# Patient Record
Sex: FEMALE | Race: WHITE | NOT HISPANIC OR LATINO | Employment: FULL TIME | URBAN - METROPOLITAN AREA
[De-identification: names, ages, dates, MRNs, and addresses within clinical notes are randomized per-mention and may not be internally consistent; named-entity substitution may affect disease eponyms.]

---

## 2020-11-02 PROCEDURE — U0003 INFECTIOUS AGENT DETECTION BY NUCLEIC ACID (DNA OR RNA); SEVERE ACUTE RESPIRATORY SYNDROME CORONAVIRUS 2 (SARS-COV-2) (CORONAVIRUS DISEASE [COVID-19]), AMPLIFIED PROBE TECHNIQUE, MAKING USE OF HIGH THROUGHPUT TECHNOLOGIES AS DESCRIBED BY CMS-2020-01-R: HCPCS | Performed by: FAMILY MEDICINE

## 2020-11-04 ENCOUNTER — TELEPHONE (OUTPATIENT)
Dept: URGENT CARE | Facility: CLINIC | Age: 45
End: 2020-11-04

## 2021-07-02 ENCOUNTER — OFFICE (AMBULATORY)
Dept: URBAN - METROPOLITAN AREA CLINIC 64 | Facility: CLINIC | Age: 46
End: 2021-07-02

## 2021-07-02 VITALS
HEART RATE: 66 BPM | HEIGHT: 64 IN | DIASTOLIC BLOOD PRESSURE: 67 MMHG | WEIGHT: 143 LBS | SYSTOLIC BLOOD PRESSURE: 107 MMHG

## 2021-07-02 DIAGNOSIS — K59.00 CONSTIPATION, UNSPECIFIED: ICD-10-CM

## 2021-07-02 DIAGNOSIS — Z12.11 ENCOUNTER FOR SCREENING FOR MALIGNANT NEOPLASM OF COLON: ICD-10-CM

## 2021-07-02 DIAGNOSIS — K30 FUNCTIONAL DYSPEPSIA: ICD-10-CM

## 2021-07-02 DIAGNOSIS — R13.10 DYSPHAGIA, UNSPECIFIED: ICD-10-CM

## 2021-07-02 PROCEDURE — 99204 OFFICE O/P NEW MOD 45 MIN: CPT | Performed by: NURSE PRACTITIONER

## 2021-07-02 RX ORDER — OMEPRAZOLE 40 MG/1
40 CAPSULE, DELAYED RELEASE ORAL
Qty: 90 | Refills: 2 | Status: COMPLETED
Start: 2021-07-02 | End: 2021-12-06

## 2021-09-10 ENCOUNTER — OFFICE (AMBULATORY)
Dept: URBAN - METROPOLITAN AREA PATHOLOGY 4 | Facility: PATHOLOGY | Age: 46
End: 2021-09-10

## 2021-09-10 ENCOUNTER — ON CAMPUS - OUTPATIENT (AMBULATORY)
Dept: URBAN - METROPOLITAN AREA HOSPITAL 2 | Facility: HOSPITAL | Age: 46
End: 2021-09-10
Payer: COMMERCIAL

## 2021-09-10 VITALS
DIASTOLIC BLOOD PRESSURE: 58 MMHG | HEART RATE: 62 BPM | SYSTOLIC BLOOD PRESSURE: 147 MMHG | HEART RATE: 79 BPM | SYSTOLIC BLOOD PRESSURE: 148 MMHG | RESPIRATION RATE: 126 BRPM | DIASTOLIC BLOOD PRESSURE: 79 MMHG | DIASTOLIC BLOOD PRESSURE: 76 MMHG | HEART RATE: 63 BPM | SYSTOLIC BLOOD PRESSURE: 125 MMHG | HEIGHT: 64 IN | SYSTOLIC BLOOD PRESSURE: 116 MMHG | DIASTOLIC BLOOD PRESSURE: 86 MMHG | SYSTOLIC BLOOD PRESSURE: 120 MMHG | SYSTOLIC BLOOD PRESSURE: 134 MMHG | SYSTOLIC BLOOD PRESSURE: 105 MMHG | OXYGEN SATURATION: 100 % | DIASTOLIC BLOOD PRESSURE: 74 MMHG | HEART RATE: 52 BPM | DIASTOLIC BLOOD PRESSURE: 73 MMHG | OXYGEN SATURATION: 99 % | DIASTOLIC BLOOD PRESSURE: 70 MMHG | RESPIRATION RATE: 18 BRPM | SYSTOLIC BLOOD PRESSURE: 112 MMHG | HEART RATE: 74 BPM | HEART RATE: 76 BPM | RESPIRATION RATE: 16 BRPM | HEART RATE: 67 BPM | HEART RATE: 57 BPM | SYSTOLIC BLOOD PRESSURE: 117 MMHG | HEART RATE: 82 BPM | DIASTOLIC BLOOD PRESSURE: 81 MMHG | TEMPERATURE: 98.4 F | WEIGHT: 145 LBS

## 2021-09-10 DIAGNOSIS — K64.8 OTHER HEMORRHOIDS: ICD-10-CM

## 2021-09-10 DIAGNOSIS — R13.10 DYSPHAGIA, UNSPECIFIED: ICD-10-CM

## 2021-09-10 DIAGNOSIS — D13.1 BENIGN NEOPLASM OF STOMACH: ICD-10-CM

## 2021-09-10 DIAGNOSIS — Z12.11 ENCOUNTER FOR SCREENING FOR MALIGNANT NEOPLASM OF COLON: ICD-10-CM

## 2021-09-10 DIAGNOSIS — K63.5 POLYP OF COLON: ICD-10-CM

## 2021-09-10 DIAGNOSIS — K31.9 DISEASE OF STOMACH AND DUODENUM, UNSPECIFIED: ICD-10-CM

## 2021-09-10 DIAGNOSIS — D12.2 BENIGN NEOPLASM OF ASCENDING COLON: ICD-10-CM

## 2021-09-10 LAB
GI HISTOLOGY: A. SELECT: (no result)
GI HISTOLOGY: B. SELECT: (no result)
GI HISTOLOGY: C. UNSPECIFIED: (no result)
GI HISTOLOGY: PDF REPORT: (no result)

## 2021-09-10 PROCEDURE — 43450 DILATE ESOPHAGUS 1/MULT PASS: CPT | Performed by: INTERNAL MEDICINE

## 2021-09-10 PROCEDURE — 43239 EGD BIOPSY SINGLE/MULTIPLE: CPT | Performed by: INTERNAL MEDICINE

## 2021-09-10 PROCEDURE — 88305 TISSUE EXAM BY PATHOLOGIST: CPT | Performed by: INTERNAL MEDICINE

## 2021-09-10 PROCEDURE — 45385 COLONOSCOPY W/LESION REMOVAL: CPT | Mod: 33 | Performed by: INTERNAL MEDICINE

## 2021-09-10 PROCEDURE — 45380 COLONOSCOPY AND BIOPSY: CPT | Mod: 33,59 | Performed by: INTERNAL MEDICINE

## 2021-09-10 PROCEDURE — 45380 COLONOSCOPY AND BIOPSY: CPT | Mod: 59,33 | Performed by: INTERNAL MEDICINE

## 2021-09-20 ENCOUNTER — OFFICE (AMBULATORY)
Dept: URBAN - METROPOLITAN AREA CLINIC 64 | Facility: CLINIC | Age: 46
End: 2021-09-20

## 2021-09-20 VITALS
WEIGHT: 148 LBS | HEIGHT: 64 IN | SYSTOLIC BLOOD PRESSURE: 93 MMHG | HEART RATE: 65 BPM | DIASTOLIC BLOOD PRESSURE: 58 MMHG

## 2021-09-20 DIAGNOSIS — K59.00 CONSTIPATION, UNSPECIFIED: ICD-10-CM

## 2021-09-20 DIAGNOSIS — R13.10 DYSPHAGIA, UNSPECIFIED: ICD-10-CM

## 2021-09-20 PROBLEM — D37.1 NEOPLASM OF UNCERTAIN BEHAVIOR OF STOMACH: Status: ACTIVE | Noted: 2021-09-10

## 2021-09-20 PROCEDURE — 99214 OFFICE O/P EST MOD 30 MIN: CPT | Performed by: NURSE PRACTITIONER

## 2021-09-20 RX ORDER — OMEPRAZOLE 40 MG/1
CAPSULE, DELAYED RELEASE ORAL
Qty: 90 | Refills: 1 | Status: ACTIVE

## 2022-03-28 RX ORDER — BUPROPION HYDROCHLORIDE 100 MG/1
100 TABLET ORAL DAILY
COMMUNITY

## 2022-04-02 ENCOUNTER — LAB (OUTPATIENT)
Dept: LAB | Facility: HOSPITAL | Age: 47
End: 2022-04-02

## 2022-04-02 PROCEDURE — C9803 HOPD COVID-19 SPEC COLLECT: HCPCS

## 2022-04-02 PROCEDURE — U0004 COV-19 TEST NON-CDC HGH THRU: HCPCS

## 2022-04-03 LAB — SARS-COV-2 ORF1AB RESP QL NAA+PROBE: NOT DETECTED

## 2022-04-04 ENCOUNTER — ANESTHESIA EVENT (OUTPATIENT)
Dept: GASTROENTEROLOGY | Facility: HOSPITAL | Age: 47
End: 2022-04-04

## 2022-04-04 NOTE — ANESTHESIA PREPROCEDURE EVALUATION
Anesthesia Evaluation     Patient summary reviewed and Nursing notes reviewed   NPO Solid Status: > 6 hours  NPO Liquid Status: > 6 hours           Airway   Mallampati: II  TM distance: >3 FB  Neck ROM: full  No difficulty expected  Dental      Pulmonary - negative pulmonary ROS and normal exam    breath sounds clear to auscultation  Cardiovascular - negative cardio ROS and normal exam    Rhythm: regular  Rate: normal        Neuro/Psych- negative ROS  GI/Hepatic/Renal/Endo    (+)  GERD,      Musculoskeletal (-) negative ROS    Abdominal  - normal exam   Substance History - negative use     OB/GYN          Other                        Anesthesia Plan    ASA 2     general   total IV anesthesia(Patient identified; pre-operative vital signs, all relevant labs/studies, complete medical/surgical/anesthetic history, full medication list, full allergy list, and NPO status obtained/reviewed; physical assessment performed; anesthetic options, side effects, potential complications, risks, and benefits discussed; questions answered; written anesthesia consent obtained; patient cleared for procedure; anesthesia machine and equipment checked and functioning)  intravenous induction     Anesthetic plan, all risks, benefits, and alternatives have been provided, discussed and informed consent has been obtained with: patient.        CODE STATUS:

## 2022-04-05 ENCOUNTER — HOSPITAL ENCOUNTER (OUTPATIENT)
Facility: HOSPITAL | Age: 47
Setting detail: HOSPITAL OUTPATIENT SURGERY
Discharge: HOME | End: 2022-04-05
Attending: INTERNAL MEDICINE | Admitting: INTERNAL MEDICINE

## 2022-04-05 ENCOUNTER — ANESTHESIA (OUTPATIENT)
Dept: GASTROENTEROLOGY | Facility: HOSPITAL | Age: 47
End: 2022-04-05

## 2022-04-05 ENCOUNTER — ON CAMPUS - OUTPATIENT (AMBULATORY)
Dept: URBAN - METROPOLITAN AREA HOSPITAL 85 | Facility: HOSPITAL | Age: 47
End: 2022-04-05

## 2022-04-05 VITALS
HEIGHT: 65 IN | WEIGHT: 146.83 LBS | HEART RATE: 77 BPM | SYSTOLIC BLOOD PRESSURE: 107 MMHG | BODY MASS INDEX: 24.46 KG/M2 | RESPIRATION RATE: 17 BRPM | TEMPERATURE: 98.5 F | DIASTOLIC BLOOD PRESSURE: 68 MMHG | OXYGEN SATURATION: 96 %

## 2022-04-05 DIAGNOSIS — K31.7 POLYP OF STOMACH AND DUODENUM: ICD-10-CM

## 2022-04-05 DIAGNOSIS — R10.13 DYSPEPSIA: ICD-10-CM

## 2022-04-05 DIAGNOSIS — R13.10 DYSPHAGIA, UNSPECIFIED: ICD-10-CM

## 2022-04-05 DIAGNOSIS — K21.9 GERD (GASTROESOPHAGEAL REFLUX DISEASE): ICD-10-CM

## 2022-04-05 PROCEDURE — 25010000002 PROPOFOL 200 MG/20ML EMULSION: Performed by: ANESTHESIOLOGY

## 2022-04-05 PROCEDURE — 43450 DILATE ESOPHAGUS 1/MULT PASS: CPT | Mod: 59 | Performed by: INTERNAL MEDICINE

## 2022-04-05 PROCEDURE — 88305 TISSUE EXAM BY PATHOLOGIST: CPT | Performed by: INTERNAL MEDICINE

## 2022-04-05 PROCEDURE — 43251 EGD REMOVE LESION SNARE: CPT | Performed by: INTERNAL MEDICINE

## 2022-04-05 RX ORDER — MIDAZOLAM HYDROCHLORIDE 1 MG/ML
1 INJECTION INTRAMUSCULAR; INTRAVENOUS
Status: DISCONTINUED | OUTPATIENT
Start: 2022-04-05 | End: 2022-04-05 | Stop reason: HOSPADM

## 2022-04-05 RX ORDER — SODIUM CHLORIDE 9 MG/ML
50 INJECTION, SOLUTION INTRAVENOUS CONTINUOUS
Status: DISCONTINUED | OUTPATIENT
Start: 2022-04-05 | End: 2022-04-05 | Stop reason: HOSPADM

## 2022-04-05 RX ORDER — SODIUM CHLORIDE 0.9 % (FLUSH) 0.9 %
3 SYRINGE (ML) INJECTION EVERY 12 HOURS SCHEDULED
Status: DISCONTINUED | OUTPATIENT
Start: 2022-04-05 | End: 2022-04-05 | Stop reason: HOSPADM

## 2022-04-05 RX ORDER — SODIUM CHLORIDE 0.9 % (FLUSH) 0.9 %
3-10 SYRINGE (ML) INJECTION AS NEEDED
Status: DISCONTINUED | OUTPATIENT
Start: 2022-04-05 | End: 2022-04-05 | Stop reason: HOSPADM

## 2022-04-05 RX ORDER — SODIUM CHLORIDE 0.9 % (FLUSH) 0.9 %
10 SYRINGE (ML) INJECTION EVERY 12 HOURS SCHEDULED
Status: DISCONTINUED | OUTPATIENT
Start: 2022-04-05 | End: 2022-04-05 | Stop reason: HOSPADM

## 2022-04-05 RX ORDER — ONDANSETRON 2 MG/ML
4 INJECTION INTRAMUSCULAR; INTRAVENOUS ONCE AS NEEDED
Status: DISCONTINUED | OUTPATIENT
Start: 2022-04-05 | End: 2022-04-05 | Stop reason: HOSPADM

## 2022-04-05 RX ORDER — SODIUM CHLORIDE 0.9 % (FLUSH) 0.9 %
10 SYRINGE (ML) INJECTION AS NEEDED
Status: DISCONTINUED | OUTPATIENT
Start: 2022-04-05 | End: 2022-04-05 | Stop reason: HOSPADM

## 2022-04-05 RX ORDER — PROPOFOL 10 MG/ML
INJECTION, EMULSION INTRAVENOUS AS NEEDED
Status: DISCONTINUED | OUTPATIENT
Start: 2022-04-05 | End: 2022-04-05 | Stop reason: SURG

## 2022-04-05 RX ORDER — LIDOCAINE HYDROCHLORIDE 20 MG/ML
INJECTION, SOLUTION EPIDURAL; INFILTRATION; INTRACAUDAL; PERINEURAL AS NEEDED
Status: DISCONTINUED | OUTPATIENT
Start: 2022-04-05 | End: 2022-04-05 | Stop reason: SURG

## 2022-04-05 RX ORDER — SODIUM CHLORIDE 9 MG/ML
30 INJECTION, SOLUTION INTRAVENOUS CONTINUOUS PRN
Status: DISCONTINUED | OUTPATIENT
Start: 2022-04-05 | End: 2022-04-05 | Stop reason: HOSPADM

## 2022-04-05 RX ADMIN — SODIUM CHLORIDE 50 ML/HR: 9 INJECTION, SOLUTION INTRAVENOUS at 07:23

## 2022-04-05 RX ADMIN — PROPOFOL 25 MG: 10 INJECTION, EMULSION INTRAVENOUS at 08:09

## 2022-04-05 RX ADMIN — PROPOFOL 50 MG: 10 INJECTION, EMULSION INTRAVENOUS at 08:15

## 2022-04-05 RX ADMIN — PROPOFOL 25 MG: 10 INJECTION, EMULSION INTRAVENOUS at 08:11

## 2022-04-05 RX ADMIN — LIDOCAINE HYDROCHLORIDE 75 MG: 20 INJECTION, SOLUTION EPIDURAL; INFILTRATION; INTRACAUDAL; PERINEURAL at 08:08

## 2022-04-05 RX ADMIN — PROPOFOL 25 MG: 10 INJECTION, EMULSION INTRAVENOUS at 08:19

## 2022-04-05 RX ADMIN — PROPOFOL 25 MG: 10 INJECTION, EMULSION INTRAVENOUS at 08:13

## 2022-04-05 RX ADMIN — PROPOFOL 100 MG: 10 INJECTION, EMULSION INTRAVENOUS at 08:08

## 2022-04-05 RX ADMIN — PROPOFOL 50 MG: 10 INJECTION, EMULSION INTRAVENOUS at 08:17

## 2022-04-05 NOTE — ANESTHESIA POSTPROCEDURE EVALUATION
Patient: Betty Mcmullen    Procedure Summary     Date: 04/05/22 Room / Location: [facility] ENDOSCOPY 4 / [facility] ENDOSCOPY    Anesthesia Start: 0804 Anesthesia Stop: 0824    Procedure: ESOPHAGOGASTRODUODENOSCOPY with polypectomy x13 and dilatation (52 bougie) (N/A ) Diagnosis:       GERD (gastroesophageal reflux disease)      Dyspepsia      (GERD (gastroesophageal reflux disease) [K21.9])      (Dyspepsia [R10.13])    Surgeons: Radha Elkins MD Provider: Brian Bojra MD    Anesthesia Type: general ASA Status: 2          Anesthesia Type: general    Vitals  No vitals data found for the desired time range.          Post Anesthesia Care and Evaluation    Patient location during evaluation: PACU  Patient participation: complete - patient cannot participate  Level of consciousness: responsive to physical stimuli and responsive to light touch  Pain scale: See nurse's notes for pain score.  Pain management: adequate  Airway patency: patent  Anesthetic complications: No anesthetic complications  PONV Status: none  Cardiovascular status: acceptable  Respiratory status: acceptable, face mask and oral airway  Hydration status: acceptable    Comments: Patient seen and examined postoperatively; vital signs stable; SpO2 greater than or equal to 90%; cardiopulmonary status stable; nausea/vomiting adequately controlled; pain adequately controlled; no apparent anesthesia complications; patient discharged from anesthesia care when discharge criteria were met

## 2022-04-05 NOTE — H&P
" LOS: 0 days   Patient Care Team:  Tracie Brice MD as PCP - General (Family Medicine)      Subjective     Interval History:     Subjective: Gastric polyp and Dysphagia      ROS:   No chest pain, shortness of breath, or cough.        Medication Review:     Current Facility-Administered Medications:   •  midazolam (VERSED) injection 1 mg, 1 mg, Intravenous, Q10 Min PRN, Brian Borja MD  •  sodium chloride 0.9 % flush 10 mL, 10 mL, Intravenous, Q12H, Brian Borja MD  •  sodium chloride 0.9 % flush 10 mL, 10 mL, Intravenous, PRN, Brian Borja MD  •  sodium chloride 0.9 % infusion, 50 mL/hr, Intravenous, Continuous, Radha Elkins MD, Last Rate: 50 mL/hr at 04/05/22 0723, 50 mL/hr at 04/05/22 0723      Objective     Vital Signs  Vitals:    03/28/22 1428 04/05/22 0714   BP:  109/50   BP Location:  Left arm   Patient Position:  Sitting   Pulse:  69   Resp:  18   Temp:  98.5 °F (36.9 °C)   TempSrc:  Oral   SpO2:  98%   Weight: 62.6 kg (138 lb) 66.6 kg (146 lb 13.2 oz)   Height: 165.1 cm (65\") 165.1 cm (65\")       Physical Exam:    General Appearance:    Awake and alert, in no acute distress   Head:    Normocephalic, without obvious abnormality   Eyes:          Conjunctivae normal, anicteric sclera   Throat:   No oral lesions, no thrush, oral mucosa moist   Neck:   No adenopathy, supple, no JVD   Lungs:     respirations regular, even and unlabored   Abdomen:     Soft, non-tender, no rebound or guarding, non-distended, no hepatosplenomegaly   Rectal:     Deferred   Extremities:   No edema, no cyanosis   Skin:   No bruising or rash, no jaundice        Results Review:    Lab Results (last 24 hours)     ** No results found for the last 24 hours. **          Imaging Results (Last 24 Hours)     ** No results found for the last 24 hours. **            Assessment/Plan   Proceed with scope and MAC anesthesia        Radha Elkins MD  04/05/22  08:05 EDT  "

## 2022-04-05 NOTE — OP NOTE
ESOPHAGOGASTRODUODENOSCOPY Procedure Report    Patient Name:  Betty Mcmullen  YOB: 1975    Date of Surgery:  4/5/2022     Pre-Op Diagnosis:  Personal history of gastric polyp adenoma  Dysphagia       Post Op Diagnosis:  Gastric polyps      Procedure/CPT® Codes:      Procedure(s):  ESOPHAGOGASTRODUODENOSCOPY with polypectomy x13 and dilatation (52 bougie)    Staff:  Surgeon(s):  Radha Elkins MD         Anesthesia: Monitored Anesthesia Care    Implants:    Nothing was implanted during the procedure    Specimen:        See Below    No blood loss    Complications:  None     Description of Procedure:  Informed consent was obtained for the procedure, including sedation.  Risks of perforation, hemorrhage, adverse drug reaction and aspiration were discussed.  The patient was brought into the endoscopy suite. Continuous cardiopulmonary monitoring was performed. The patient was placed in the left lateral decubitus position.  The bite block was inserted into the patient's mouth. After adequate sedation was attained, the Olympus gastroscope was inserted into the patient's mouth and advanced to the second portion of the duodenum without difficulty.  Circumferential examination was performed. A retroflex exam was performed in the patient's stomach.  On completion of the exam, the bowel was decompressed, the scope was removed from the patient, the patient tolerated the procedure well, there were no immediate post-operative complications.     Examination of the esophagus showed normal mucosa.  Esophageal dilation was performed with a 52 Latvian Mccray.  Repeat esophagoscopy showed no mucosal tearing  Examination of the stomach showed multiple gastric polyps involving the gastric body.  Most of them appeared to be fundic gland polyps, but because one was an adenoma on EGD last year, I performed a cold snare polypectomy to all of the visualized polyps of the gastric body.  The polyps were completely  removed.  Retroflex examination of the stomach was normal   Examination of the duodenum showed normal mucosa      Impression:  Gastric polyps x13, 5 to 10 mm in size, removed in single piece fashion with cold snare polypectomy  Normal-appearing esophagus status post 52 French Mccray dilation    Recommendations:  Follow-up histopathology  Repeat EGD in 1 year to follow-up gastric tubular adenoma  Repeat EGD as needed for dysphagia  Follow-up in the office within 3 to 6 months      Radha Elkins MD     Date: 4/5/2022  Time: 08:22 EDT

## 2022-04-05 NOTE — ADDENDUM NOTE
Addendum  created 04/05/22 0826 by Brian Borja MD    Intraprocedure Event edited, Review and Sign - Ready for Procedure

## 2022-04-05 NOTE — DISCHARGE INSTRUCTIONS
A responsible adult should stay with you and you should rest quietly for the rest of the day.    Do not drink alcohol, drive, operate any heavy machinery or power tools or make any legal/important decisions for the next 24 hours.     Progress your diet as tolerated.  If you begin to experience severe pain, increased shortness of breath, racing heartbeat or a fever above 101 F, seek immediate medical attention.     Follow up with MD as instructed. Call office for results in 3 to 5 days if needed.[phone]  :  Gastric polyps x13, 5 to 10 mm in size, removed in single piece fashion with cold snare polypectomy  Normal-appearing esophagus status post 52 French Mccray dilation     Recommendations:  Follow-up histopathology  Repeat EGD in 1 year to follow-up gastric tubular adenoma  Repeat EGD as needed for dysphagia  Follow-up in the office within 3 to 6 months

## 2022-04-06 LAB
LAB AP CASE REPORT: NORMAL
PATH REPORT.FINAL DX SPEC: NORMAL
PATH REPORT.GROSS SPEC: NORMAL

## 2022-06-03 ENCOUNTER — TREATMENT (OUTPATIENT)
Dept: PHYSICAL THERAPY | Facility: CLINIC | Age: 47
End: 2022-06-03

## 2022-06-03 DIAGNOSIS — G89.29 CHRONIC LEFT-SIDED LOW BACK PAIN WITH LEFT-SIDED SCIATICA: Primary | ICD-10-CM

## 2022-06-03 DIAGNOSIS — M54.42 CHRONIC LEFT-SIDED LOW BACK PAIN WITH LEFT-SIDED SCIATICA: Primary | ICD-10-CM

## 2022-06-03 DIAGNOSIS — M54.2 PAIN, NECK: ICD-10-CM

## 2022-06-03 PROCEDURE — 97162 PT EVAL MOD COMPLEX 30 MIN: CPT | Performed by: PHYSICAL THERAPIST

## 2022-06-03 PROCEDURE — 97110 THERAPEUTIC EXERCISES: CPT | Performed by: PHYSICAL THERAPIST

## 2022-06-03 NOTE — PROGRESS NOTES
Physical Therapy Initial Evaluation and Plan of Care    Patient: Betty Mcmullen   : 1975  Diagnosis/ICD-10 Code:  Chronic left-sided low back pain with left-sided sciatica [M54.42, G89.29]  Referring practitioner: Self Referring  Date of initial visit : 6/3/2022  Today's Date: 6/3/2022  Patient seen for 1  session    Visit Diagnoses:    ICD-10-CM ICD-9-CM   1. Chronic left-sided low back pain with left-sided sciatica  M54.42 724.2    G89.29 724.3     338.29   2. Pain, neck  M54.2 723.1        Subjective Evaluation    History of Present Illness  Mechanism of injury: Patient is a 46 year old female who presents self referral with lower back and neck pain.   She reports multi-year history of lower back pain provoked with exercising and with referral intermittently into her left leg and hip.  Reports recent right sided neck pain and tingling in her right hand as well with no specific change in activity.  Pain provoked by lifting weights, sleeping, prolonged positioning  Prior physical therapy for her lower back pain has been helpful including dry needling but her PT moved recently.      Subjective comment: Revised Oswestry - 20%  Patient Occupation:   Quality of life: good    Pain  Current pain ratin  At best pain ratin  At worst pain ratin (Lower back 7-8/10, 6-7/10 in R UE)  Pain location: Lower back, neck, R UE.  Quality: burning and dull ache  Relieving factors: change in position  Exacerbated by: Lifting weight, sleeping.  Progression: no change    Diagnostic Tests  Abnormal x-ray: No recent imaging.    Treatments  Previous treatment: physical therapy  Patient Goals  Patient goals for therapy: decreased pain  Patient's goals regarding treatment: Decreased pain and ability for full prior activity            Objective          Static Posture     Comments  Flat upper thoracic spine, slight increase lumbar lordosis, moderate paraspinal guarding    Neurological Testing      Sensation   Cervical/Thoracic   Left   Intact: light touch    Right   Intact: light touch    Lumbar   Left   Intact: light touch    Right   Intact: light touch    Reflexes   Left   Biceps (C5/C6): normal (2+)  Brachioradialis (C6): normal (2+)  Triceps (C7): normal (2+)  Patellar (L4): normal (2+)  Achilles (S1): trace (1+)    Right   Biceps (C5/C6): normal (2+)  Brachioradialis (C6): normal (2+)  Triceps (C7): normal (2+)  Patellar (L4): normal (2+)  Achilles (S1): trace (1+)    Active Range of Motion   Cervical/Thoracic Spine   Cervical    Left lateral flexion: 18 degrees   Right lateral flexion: 22 degrees   Left rotation: 50 degrees   Right rotation: 65 degrees     Additional Active Range of Motion Details  Slight decrease upper cervical rotation to L   FB 50% decrease  BB - 40% decrease  RR - 25% decrease  RSB/LSB 10% decrease    Strength/Myotome Testing     Lumbar   Left   Normal strength    Right   Normal strength    Tests   Cervical     Left   Positive active compression ([place]) and cervical distraction.   Negative Spurling's sign.     Right   Positive active compression ([place]) and cervical distraction.   Negative Spurling's sign.     Thoracic   Negative slump.     Additional Tests Details  Spurling's negative for UE symptoms - painful bilaterally near CT junction    Slump + L negative R          Assessment & Plan     Assessment  Impairments: abnormal or restricted ROM, activity intolerance, lacks appropriate home exercise program and pain with function  Functional Limitations: carrying objects, lifting, sleeping, pulling, pushing, uncomfortable because of pain and reaching overhead  Assessment details: Presents with limits and pain with cervical spine and lumbar spine ROM, positive neural tension testing greater in R UE and L UE, positive cervical distraction and positive cervical downglide R lower cervical spine, paraspinal guarding in lumbar spine in standing and activity limits including  sleeping, overhead lifting, squatting, deadlifting per pain.  Positive prior response to physical therapy and to UE symptoms with treatment this visit.  She would benefit from skilled PT to address the above and restore to highest level of prior function.   Prognosis: good    Goals  Plan Goals: ST. Neck and lumbar spine pain decreased to 5/10 at worst over 2 weeks.   2. Independent and compliant with HEP and activity modification instructions over 2 weeks.   3. R UE symptoms to begin to centralize over 2 weeks.     LT. Neck and lumbar spine pain decreased to 3/10 at worst with prior level of activities over 8 weeks.   2. Cervical and lumbar AROM all planes WFL w/o pain overe 8 weeks.   3. Negative neural tension testing in R UE and L LE over 8 weeks.   4. To verbalize readiness for discharge over 10 weeks per improved symptoms and HEP independence.       Plan  Therapy options: will be seen for skilled therapy services  Planned modality interventions: cryotherapy, dry needling, electrical stimulation/Russian stimulation, TENS and traction  Planned therapy interventions: manual therapy, neuromuscular re-education, balance/weight-bearing training, flexibility, soft tissue mobilization, spinal/joint mobilization, functional ROM exercises, strengthening, stretching, therapeutic activities and joint mobilization  Frequency: 2x week  Duration in weeks: 8  Treatment plan discussed with: patient        History # of Personal Factors and/or Comorbidities: MODERATE (1-2)  Examination of Body System(s): # of elements: MODERATE (3)  Clinical Presentation: EVOLVING  Clinical Decision Making: MODERATE       Timed:         Manual Therapy:    5     mins  30350;     Therapeutic Exercise:    10     mins  20994;       Un-Timed:  Mod Eval     30     Mins  44457      Timed Treatment:   30   mins   Total Treatment:     45   mins          PT SIGNATURE: Iban Rowell PT, DPT   [place] Lic #[id number]    DATE TREATMENT INITIATED:  6/3/2022    Initial Certification  Certification Period: 9/1/2022  I certify that the therapy services are furnished while this patient is under my care.  The services outlined above are required by this patient, and will be reviewed every 90 days.     PHYSICIAN: Referring, Self      DATE:     Please sign and return via fax to [phone].. Thank you, [facility].

## 2022-06-08 ENCOUNTER — TREATMENT (OUTPATIENT)
Dept: PHYSICAL THERAPY | Facility: CLINIC | Age: 47
End: 2022-06-08

## 2022-06-08 DIAGNOSIS — M54.2 PAIN, NECK: Primary | ICD-10-CM

## 2022-06-08 PROCEDURE — 97012 MECHANICAL TRACTION THERAPY: CPT | Performed by: PHYSICAL THERAPIST

## 2022-06-08 NOTE — PROGRESS NOTES
Physical Therapy Daily Progress Note  Visit: 2    Betty Mcmullen reports: her neck felt better with initial visit.  Lower back is sore but typical for her.   YOB: 1975  Referring practitioner : Self Referring  Date of Initial: Type: THERAPY  Noted: 6/3/2022  Today's date: 6/8/2022  Patient seen for 2 sessions    Visit Diagnoses:    ICD-10-CM ICD-9-CM   1. Pain, neck  M54.2 723.1       Subjective       Objective   See Exercise, Manual, and Modality Logs for complete treatment.       Assessment/Plan    Requested traction only per good tolerance with manual traction at initial evaluation.  Symptoms centralizing in R UE.            Un-timed:         Traction     15     mins 34675      Timed Treatment:   0   mins   Total Treatment:     15   mins         Iban Rowell PT, DPT  Physical Therapist  [place] Lic #[id number]   Danger to self; mental illness expected to respond to inpatient care Danger to self; mental illness expected to respond to inpatient care Danger to self; mental illness expected to respond to inpatient care Danger to self; mental illness expected to respond to inpatient care

## 2022-06-13 ENCOUNTER — TREATMENT (OUTPATIENT)
Dept: PHYSICAL THERAPY | Facility: CLINIC | Age: 47
End: 2022-06-13

## 2022-06-13 DIAGNOSIS — M54.2 PAIN, NECK: Primary | ICD-10-CM

## 2022-06-13 DIAGNOSIS — G89.29 CHRONIC LEFT-SIDED LOW BACK PAIN WITH LEFT-SIDED SCIATICA: ICD-10-CM

## 2022-06-13 DIAGNOSIS — M54.42 CHRONIC LEFT-SIDED LOW BACK PAIN WITH LEFT-SIDED SCIATICA: ICD-10-CM

## 2022-06-13 PROCEDURE — 20560 NDL INSJ W/O NJX 1 OR 2 MUSC: CPT | Performed by: PHYSICAL THERAPIST

## 2022-06-13 PROCEDURE — 97012 MECHANICAL TRACTION THERAPY: CPT | Performed by: PHYSICAL THERAPIST

## 2022-06-15 ENCOUNTER — TELEPHONE (OUTPATIENT)
Dept: PHYSICAL THERAPY | Facility: CLINIC | Age: 47
End: 2022-06-15

## 2022-06-15 NOTE — TELEPHONE ENCOUNTER
PATIENT LEFT AND DID NOT SCHEDULE ANY FOLLOW UP APPT. CAN YOU SEE IF YOU CAN SCHEDULE FOR THE NEXT THREE WEEKS. I STARTED FROM WHERE I COULD GET DATES BUT THEIR IS A WEEK IN July I DID NOT SEE ANYTHING. IF YOU COULD REACH OUT AND LET HER KNOW WHAT THE GAME PLAN IS THAT WOULD BE GREAT.

## 2022-06-20 ENCOUNTER — TREATMENT (OUTPATIENT)
Dept: PHYSICAL THERAPY | Facility: CLINIC | Age: 47
End: 2022-06-20

## 2022-06-20 DIAGNOSIS — M54.2 PAIN, NECK: Primary | ICD-10-CM

## 2022-06-20 DIAGNOSIS — M54.42 CHRONIC LEFT-SIDED LOW BACK PAIN WITH LEFT-SIDED SCIATICA: ICD-10-CM

## 2022-06-20 DIAGNOSIS — G89.29 CHRONIC LEFT-SIDED LOW BACK PAIN WITH LEFT-SIDED SCIATICA: ICD-10-CM

## 2022-06-20 PROCEDURE — 20561 NDL INSJ W/O NJX 3+ MUSC: CPT | Performed by: PHYSICAL THERAPIST

## 2022-06-20 PROCEDURE — 97012 MECHANICAL TRACTION THERAPY: CPT | Performed by: PHYSICAL THERAPIST

## 2022-06-20 NOTE — PROGRESS NOTES
Physical Therapy Daily Progress Note    VISIT#: 4    Subjective   Betty Mcmullen reports that the dry needling helped and she would like to continue today. Her low back has also been bothering her a lot and she would like to work on that as well.       Objective     See Exercise, Manual, and Modality Logs for complete treatment.     Patient Education: TDN    Assessment/Plan   Continued cervical traction and dry needling. Pt tolerated well. TDN completed by Iban Rowell PT. Plan to continue treatments next session according to symptoms/needs.     Progress per Plan of Care and Progress strengthening /stabilization /functional activity          Timed:         Manual Therapy:         mins  88448;     Therapeutic Exercise:         mins  10821;     Neuromuscular Harshad:        mins  51630;    Therapeutic Activity:          mins  88902;     Gait Training:           mins  66272;     Ultrasound:          mins  45571;    Ionto                                   mins   09475  Self Care                            mins   65900  Canalith Repos                   mins  36246    Un-Timed:  Electrical Stimulation:         mins  76139 ( );  Dry Needling     10     mins self-pay  Traction     15     mins 46021  Low Eval          Mins  55012  Mod Eval          Mins  00548  High Eval                            Mins  45757  Re-Eval                               mins  75665    Timed Treatment:   0   mins   Total Treatment:     25   mins          Ania Torres  Physical Therapist Assistant  [place] License # [id number]

## 2022-06-29 ENCOUNTER — TREATMENT (OUTPATIENT)
Dept: PHYSICAL THERAPY | Facility: CLINIC | Age: 47
End: 2022-06-29

## 2022-06-29 DIAGNOSIS — G89.29 CHRONIC LEFT-SIDED LOW BACK PAIN WITH LEFT-SIDED SCIATICA: ICD-10-CM

## 2022-06-29 DIAGNOSIS — M54.42 CHRONIC LEFT-SIDED LOW BACK PAIN WITH LEFT-SIDED SCIATICA: ICD-10-CM

## 2022-06-29 DIAGNOSIS — M54.2 PAIN, NECK: Primary | ICD-10-CM

## 2022-06-29 PROCEDURE — 97012 MECHANICAL TRACTION THERAPY: CPT | Performed by: PHYSICAL THERAPIST

## 2022-06-29 PROCEDURE — 20561 NDL INSJ W/O NJX 3+ MUSC: CPT | Performed by: PHYSICAL THERAPIST

## 2022-06-29 NOTE — PROGRESS NOTES
Physical Therapy Daily Progress Note      Patient: Betty Mcmullen   : 1975  Diagnosis/ICD-10 Code:  Pain, neck [M54.2]  Referring practitioner: Self Referring  Date of Initial Visit: Type: THERAPY  Noted: 6/3/2022  Today's Date: 2022  Patient seen for 5 sessions             Subjective Traction and dry needling continue to help with her cervical and right UE symptoms, stating that her right UE is not experiencing as much pain.  She also states that her low back has has been bothering her more lately, have some radicular symptoms down her left leg.    Objective   See Exercise, Manual, and Modality Logs for complete treatment.       Assessment/Plan  Pt needed reduction in traction pull today from 18 down to 16 lbs.  She responded well with 16 lbs well today.  Also, tolerated dry needling well today.  Possibly add lumbar traction to treatment due to back and leg symptoms.  Dry needling completed by Iban Rowell DPT.    Progress per Plan of Care             Un-Timed:  Dry Needling    15     mins self-pay  Traction    15    mins 23322      Timed Treatment:   0   mins   Total Treatment:     30   mins        Nathan Almonte PTA  Physical Therapist Assistant

## 2022-07-01 ENCOUNTER — OFFICE (AMBULATORY)
Dept: URBAN - METROPOLITAN AREA CLINIC 64 | Facility: CLINIC | Age: 47
End: 2022-07-01

## 2022-07-01 VITALS
HEIGHT: 64 IN | SYSTOLIC BLOOD PRESSURE: 122 MMHG | HEART RATE: 81 BPM | WEIGHT: 147 LBS | DIASTOLIC BLOOD PRESSURE: 77 MMHG

## 2022-07-01 DIAGNOSIS — R13.10 DYSPHAGIA, UNSPECIFIED: ICD-10-CM

## 2022-07-01 PROCEDURE — 99212 OFFICE O/P EST SF 10 MIN: CPT | Performed by: NURSE PRACTITIONER

## 2022-07-06 ENCOUNTER — TREATMENT (OUTPATIENT)
Dept: PHYSICAL THERAPY | Facility: CLINIC | Age: 47
End: 2022-07-06

## 2022-07-06 DIAGNOSIS — G89.29 CHRONIC LEFT-SIDED LOW BACK PAIN WITH LEFT-SIDED SCIATICA: ICD-10-CM

## 2022-07-06 DIAGNOSIS — M54.42 CHRONIC LEFT-SIDED LOW BACK PAIN WITH LEFT-SIDED SCIATICA: ICD-10-CM

## 2022-07-06 DIAGNOSIS — M54.2 PAIN, NECK: Primary | ICD-10-CM

## 2022-07-06 PROCEDURE — 20561 NDL INSJ W/O NJX 3+ MUSC: CPT | Performed by: PHYSICAL THERAPIST

## 2022-07-06 PROCEDURE — 97140 MANUAL THERAPY 1/> REGIONS: CPT | Performed by: PHYSICAL THERAPIST

## 2022-07-06 PROCEDURE — 97012 MECHANICAL TRACTION THERAPY: CPT | Performed by: PHYSICAL THERAPIST

## 2022-07-07 NOTE — PROGRESS NOTES
Physical Therapy Daily Treatment Note  Visit: 6    Betty Mcmullen reports: improving neck/UE pain - lumbar spine small improvement since beginning PT.   YOB: 1975  Referring practitioner : Self Referring  Date of Initial: Type: THERAPY  Noted: 6/3/2022  Today's date: 2022  Patient seen for 6 sessions    Visit Diagnoses:    ICD-10-CM ICD-9-CM   1. Pain, neck  M54.2 723.1   2. Chronic left-sided low back pain with left-sided sciatica  M54.42 724.2    G89.29 724.3     338.29       Subjective       Objective     ST. Neck and lumbar spine pain decreased to 5/10 at worst over 2 weeks. - Met  2. Independent and compliant with HEP and activity modification instructions over 2 weeks. - Met  3. R UE symptoms to begin to centralize over 2 weeks. - Met  See Exercise, Manual, and Modality Logs for complete treatment.       Assessment/Plan     UE symptoms centralizing.  Discussed modifications to HEP for LBP with good understanding.     Plan:  Progress note next visit           Timed:         Manual Therapy:    10     mins  47844;     Therapeutic Exercise:    5     mins  78171;             Un-Timed:  Dry Needling     10     mins self-pay  Traction     15     mins 20968    Timed Treatment:   15   mins   Total Treatment:     40   mins         Iban Rowell PT, DPT  Physical Therapist  [place] Lic #[id number]

## 2022-07-08 ENCOUNTER — TREATMENT (OUTPATIENT)
Dept: PHYSICAL THERAPY | Facility: CLINIC | Age: 47
End: 2022-07-08

## 2022-07-08 DIAGNOSIS — M54.2 PAIN, NECK: Primary | ICD-10-CM

## 2022-07-08 DIAGNOSIS — M54.42 CHRONIC LEFT-SIDED LOW BACK PAIN WITH LEFT-SIDED SCIATICA: ICD-10-CM

## 2022-07-08 DIAGNOSIS — G89.29 CHRONIC LEFT-SIDED LOW BACK PAIN WITH LEFT-SIDED SCIATICA: ICD-10-CM

## 2022-07-08 PROCEDURE — 97012 MECHANICAL TRACTION THERAPY: CPT | Performed by: PHYSICAL THERAPIST

## 2022-07-08 PROCEDURE — 20561 NDL INSJ W/O NJX 3+ MUSC: CPT | Performed by: PHYSICAL THERAPIST

## 2022-07-08 NOTE — PROGRESS NOTES
Physical Therapy Daily Treatment Note  Visit: 7    Betty Mcmullen reports: did well last visit with overall improvement in neck/UE pain and continued LBP.   YOB: 1975  Referring practitioner : Self Referring  Date of Initial: Type: THERAPY  Noted: 6/3/2022  Today's date: 7/27/2022  Patient seen for 7 sessions    Visit Diagnoses:    ICD-10-CM ICD-9-CM   1. Pain, neck  M54.2 723.1   2. Chronic left-sided low back pain with left-sided sciatica  M54.42 724.2    G89.29 724.3     338.29       Subjective       Objective   See Exercise, Manual, and Modality Logs for complete treatment.       Assessment/Plan     Dry needling added lateral hip and left multifidus in lumbar spine with positive response.  Traction helpful to centralize UE symptoms.            Timed:           Un-Timed:  Dry Needling     20     mins self-pay  Traction     15     mins 32118      Timed Treatment:   0   mins   Total Treatment:     35   mins         Iban Rowell PT, DPT  Physical Therapist  [place] Lic #[id number]

## 2022-07-12 ENCOUNTER — TREATMENT (OUTPATIENT)
Dept: PHYSICAL THERAPY | Facility: CLINIC | Age: 47
End: 2022-07-12

## 2022-07-12 DIAGNOSIS — G89.29 CHRONIC LEFT-SIDED LOW BACK PAIN WITH LEFT-SIDED SCIATICA: ICD-10-CM

## 2022-07-12 DIAGNOSIS — M54.42 CHRONIC LEFT-SIDED LOW BACK PAIN WITH LEFT-SIDED SCIATICA: ICD-10-CM

## 2022-07-12 DIAGNOSIS — M54.2 PAIN, NECK: Primary | ICD-10-CM

## 2022-07-12 PROCEDURE — 97012 MECHANICAL TRACTION THERAPY: CPT | Performed by: PHYSICAL THERAPIST

## 2022-07-12 NOTE — PROGRESS NOTES
Physical Therapy Daily Progress Note      Patient: Betty Mcmullen   : 1975  Diagnosis/ICD-10 Code:  No primary diagnosis found.  Referring practitioner: Self Referring  Date of Initial Visit: No linked episodes  Today's Date: 2022  Patient seen for Visit count could not be calculated. Make sure you are using a visit which is associated with an episode. sessions             Subjective Pt continues to report having numbness into her UE especially noted when waking in the morning.  She also continues to have c/o pain in her left lateral hip especially noted with having to sit for prolonged periods of time.    Objective Performed cervical and Lumbar Traction today.  Lumbar traction information: H/L, 65 lbs pull, Intermittent - hold 60 sec, relax 10 seconds, table closed x 15 minutes     See Exercise, Manual, and Modality Logs for complete treatment.       Assessment/Plan  Cervical traction was tolerated well.  Pt benefits from having head band applied so that the traction unit does not slide up the back of her head.  Mechanical lumbar traction was added today with almost immediate positive response in the first couple of minutes of pull stating the lateral hip pain was diminishing and that if felt good to have her hips distracted.  Pt continued to have positive response after traction and upon standing.    Progress per Plan of Care               Un-Timed:  Traction    30    mins 41644      Timed Treatment:  0   mins   Total Treatment:    30   mins        Nathan Almonte PTA  Physical Therapist Assistant

## 2022-07-15 ENCOUNTER — TREATMENT (OUTPATIENT)
Dept: PHYSICAL THERAPY | Facility: CLINIC | Age: 47
End: 2022-07-15

## 2022-07-15 DIAGNOSIS — G89.29 CHRONIC LEFT-SIDED LOW BACK PAIN WITH LEFT-SIDED SCIATICA: ICD-10-CM

## 2022-07-15 DIAGNOSIS — M54.42 CHRONIC LEFT-SIDED LOW BACK PAIN WITH LEFT-SIDED SCIATICA: ICD-10-CM

## 2022-07-15 DIAGNOSIS — M54.2 PAIN, NECK: Primary | ICD-10-CM

## 2022-07-15 PROCEDURE — 97012 MECHANICAL TRACTION THERAPY: CPT | Performed by: PHYSICAL THERAPIST

## 2022-07-15 NOTE — PROGRESS NOTES
Physical Therapy Daily Progress Note      Patient: Betty Mcmullen   : 1975  Diagnosis/ICD-10 Code:  Pain, neck [M54.2]  Referring practitioner: Self Referring  Date of Initial Visit: Type: THERAPY  Noted: 6/3/2022  Today's Date: 7/15/2022  Patient seen for 9 sessions             Subjective Lumbar traction helped with hip and leg pain for a while.  However, same symptoms returned not long afterwards.    Objective Limbar traction perameters: H/L, 55lbs pull, closed table, 60 sec on/10 sec off, x 15 min  See Exercise, Manual, and Modality Logs for complete treatment.       Assessment/Plan  Tolerated cervical traction well.  Therapist attempted lumbar traction with table open, but had to reduce pull weight.  After a few minutes of treatment, therapist noticed pt was not responding well to open table.  Table was then closed and pt tolerated remainder of treatment much better.    Progress per Plan of Care               Un-Timed:  Traction    30   mins 05505      Timed Treatment: 0   mins   Total Treatment:     30   mins        Nathan Almonte PTA  Physical Therapist Assistant

## 2022-07-20 ENCOUNTER — TREATMENT (OUTPATIENT)
Dept: PHYSICAL THERAPY | Facility: CLINIC | Age: 47
End: 2022-07-20

## 2022-07-20 DIAGNOSIS — M54.42 CHRONIC LEFT-SIDED LOW BACK PAIN WITH LEFT-SIDED SCIATICA: ICD-10-CM

## 2022-07-20 DIAGNOSIS — G89.29 CHRONIC LEFT-SIDED LOW BACK PAIN WITH LEFT-SIDED SCIATICA: ICD-10-CM

## 2022-07-20 DIAGNOSIS — M54.2 PAIN, NECK: Primary | ICD-10-CM

## 2022-07-20 PROCEDURE — 97012 MECHANICAL TRACTION THERAPY: CPT | Performed by: PHYSICAL THERAPIST

## 2022-07-20 PROCEDURE — 20561 NDL INSJ W/O NJX 3+ MUSC: CPT | Performed by: PHYSICAL THERAPIST

## 2022-07-22 NOTE — PROGRESS NOTES
Physical Therapy Daily Treatment Note  Visit: 10    Betty Mcmullen reports: sore initially after using traction on her back but felt better for 3 days afterwards.   YOB: 1975  Referring practitioner : Self Referring  Date of Initial: Type: THERAPY  Noted: 6/3/2022  Today's date: 7/22/2022  Patient seen for 10 sessions    Visit Diagnoses:    ICD-10-CM ICD-9-CM   1. Pain, neck  M54.2 723.1   2. Chronic left-sided low back pain with left-sided sciatica  M54.42 724.2    G89.29 724.3     338.29       Subjective       Objective   See Exercise, Manual, and Modality Logs for complete treatment.       Assessment/Plan    Neck and lumbar symptoms improving with traction, dry needling, HEP.     Plan:    Continue x  1 per week - progress note next visit         Timed:             Un-Timed:  Dry Needling     15     mins self-pay  Traction     15     mins 83000    Timed Treatment:   0   mins   Total Treatment:     30   mins         Iban Rowell PT, DPT  Physical Therapist  [place] Lic #[id number]

## 2022-08-10 ENCOUNTER — TREATMENT (OUTPATIENT)
Dept: PHYSICAL THERAPY | Facility: CLINIC | Age: 47
End: 2022-08-10

## 2022-08-10 ENCOUNTER — TRANSCRIBE ORDERS (OUTPATIENT)
Dept: PHYSICAL THERAPY | Facility: CLINIC | Age: 47
End: 2022-08-10

## 2022-08-10 DIAGNOSIS — M54.2 PAIN, NECK: Primary | ICD-10-CM

## 2022-08-10 DIAGNOSIS — M54.42 CHRONIC LEFT-SIDED LOW BACK PAIN WITH LEFT-SIDED SCIATICA: ICD-10-CM

## 2022-08-10 DIAGNOSIS — G89.29 CHRONIC LEFT-SIDED LOW BACK PAIN WITH LEFT-SIDED SCIATICA: ICD-10-CM

## 2022-08-10 PROCEDURE — 97110 THERAPEUTIC EXERCISES: CPT | Performed by: PHYSICAL THERAPIST

## 2022-08-10 PROCEDURE — 97012 MECHANICAL TRACTION THERAPY: CPT | Performed by: PHYSICAL THERAPIST

## 2022-08-10 NOTE — PROGRESS NOTES
Re-Assessment / Progress Note    Patient: Betty Mcmullen   : 1975  Diagnosis/ICD-10 Code:  Pain, neck [M54.2]  Referring practitioner: Self Referring  Date of Initial Visit: Episode Type: THERAPY  Noted: 6/3/2022    Today's Date: 8/10/2022  Patient seen for 11 sessions.      Subjective:   Betty Mcmullen reports: improvement in her symptoms with decreased pain in her right arm with treatment.  Reports combination of traction, dry needling, exercise modifications and home program have been helpful.   Subjective Questionnaire: NDI:20%, Revised Oswestry 14%  Clinical Progress: improved  Home Program Compliance: Yes  Treatment has included: therapeutic exercise, neuromuscular re-education, manual therapy, therapeutic activity, dry needling and moist heat    Subjective Evaluation    History of Present Illness      Patient Occupation: [employer]  Pain scale: 3/10 in UE, 7-8/10 in LE - worse in AM.  Pain scale at lowest: 4-5/10 best in LE.  Pain scale at highest: 8/10 at worst.  Quality: dull ache (Funny bone in R UE, squeezing/tightness)  Alleviating factors: Movement/stretching, dry needling, traction.  Exacerbated by: Prolonged positioning, lifting.         Objective          Static Posture     Comments  Flat upper thoracic spine, slight increase lumbar lordosis, moderate paraspinal guarding    Neurological Testing     Sensation   Cervical/Thoracic   Left   Intact: light touch    Right   Intact: light touch    Lumbar   Left   Intact: light touch    Right   Intact: light touch    Reflexes   Left   Patellar (L4): normal (2+)  Achilles (S1): normal (2+)    Right   Patellar (L4): normal (2+)  Achilles (S1): normal (2+)    Active Range of Motion   Cervical/Thoracic Spine   Cervical    Left lateral flexion: 30 degrees   Right lateral flexion: 31 degrees   Left rotation: 68 degrees   Right rotation: 72 degrees     Additional Active Range of Motion Details  Slight decrease upper cervical rotation to  L   FB 25% decrease  BB - 25% decrease  RR - WFL% decrease  RSB/LSB 10% decrease  Hip ER  Hip IR - L slightly less than R 920)    R hip WFL both IR/ER    Strength/Myotome Testing     Lumbar   Left   Normal strength    Right   Normal strength    Tests   Cervical     Left   Positive active compression (Patrick) and cervical distraction.   Negative Spurling's sign.     Right   Positive active compression (Patrick) and cervical distraction.   Negative Spurling's sign.     Thoracic   Negative slump.     Additional Tests Details  Spurling's negative for UE symptoms - painful bilaterally near CT junction    Slump + L negative R      Assessment & Plan     Assessment  Impairments: abnormal or restricted ROM and activity intolerance  Functional Limitations: lifting, pulling, pushing and reaching overhead  Assessment details: Presents with continued but improved limits in cervical and lumbar AROM, positive neural tension in R UE and bilateral LE decreased from outset of care, paraspinal guarding in lumbar spine decreased from initial visit and activity.  Has continued limits and pain especially in AM or with prolonged positioning which have been improved with cervical and lumbar traction, dry needling, and home exercise program including modifications to workout program.  She would benefit from continued care to meet all LTG and prior level of function.  Prognosis: good      Progress toward previous goals: All STG met and all LTG progressed towards     Goals    Short-term goals (STG):   1. Neck and lumbar spine pain decreased to 5/10 at worst over 2 weeks. - Met  2. Independent and compliant with HEP and activity modification instructions over 2 weeks. - Met  3. R UE symptoms to begin to centralize over 2 weeks. - Met      Long-term goals (LTG):   1. Neck and lumbar spine pain decreased to 3/10 at worst with prior level of activities over 8 weeks. - Progressed towards   2. Cervical and lumbar AROM all planes WFL w/o pain overe  8 weeks. -Progressed towards   3. Negative neural tension testing in R UE and L LE over 8 weeks. - Progressed towards   4. To verbalize readiness for discharge over 10 weeks per improved symptoms and HEP independence. - Progressed towards         Recommendations: Continue with recommendations to continue treatment   Timeframe: 6 weeks  Prognosis to achieve goals: good    PT Signature: Iban Rowell, PT, DPT          Based upon review of the patient's progress and continued therapy plan, it is my medical opinion that Betty Kemi should continue physical therapy treatment at [facility]  [address] [place] [address] [address] [place] [address] [phone].        Timed:           Therapeutic Exercise:    15     mins  78499;         Un-Timed:  Traction     15     mins 58887      Timed Treatment:   15   mins   Total Treatment:     30   mins

## 2022-08-16 ENCOUNTER — TREATMENT (OUTPATIENT)
Dept: PHYSICAL THERAPY | Facility: CLINIC | Age: 47
End: 2022-08-16

## 2022-08-16 DIAGNOSIS — M54.42 CHRONIC LEFT-SIDED LOW BACK PAIN WITH LEFT-SIDED SCIATICA: ICD-10-CM

## 2022-08-16 DIAGNOSIS — M54.2 PAIN, NECK: Primary | ICD-10-CM

## 2022-08-16 DIAGNOSIS — G89.29 CHRONIC LEFT-SIDED LOW BACK PAIN WITH LEFT-SIDED SCIATICA: ICD-10-CM

## 2022-08-16 PROCEDURE — 97012 MECHANICAL TRACTION THERAPY: CPT | Performed by: PHYSICAL THERAPIST

## 2022-08-16 NOTE — PROGRESS NOTES
Physical Therapy Daily Progress Note      Patient: Betty Mcmullen   : 1975  Diagnosis/ICD-10 Code:  Pain, neck [M54.2]  Referring practitioner: Self Referring  Date of Initial Visit: Type: THERAPY  Noted: 6/3/2022  Today's Date: 2022  Patient seen for 12 sessions             Subjective Pt continues to have some radicular symptoms down her right UE.  Continues to feel that traction is helping neck and back.  Pt declined the need for dry needling today.    Objective   See Exercise, Manual, and Modality Logs for complete treatment.       Assessment/Plan  Responded well to both cervical and lumbar traction.    Progress per Plan of Care  Resume dry needling prn.               Un-Timed:  Traction     30     mins 80598      Timed Treatment:   0   mins   Total Treatment:     30   mins        Nathan Almonte PTA  Physical Therapist Assistant

## 2022-08-23 ENCOUNTER — TREATMENT (OUTPATIENT)
Dept: PHYSICAL THERAPY | Facility: CLINIC | Age: 47
End: 2022-08-23

## 2022-08-23 DIAGNOSIS — G89.29 CHRONIC LEFT-SIDED LOW BACK PAIN WITH LEFT-SIDED SCIATICA: ICD-10-CM

## 2022-08-23 DIAGNOSIS — M54.2 PAIN, NECK: Primary | ICD-10-CM

## 2022-08-23 DIAGNOSIS — M54.42 CHRONIC LEFT-SIDED LOW BACK PAIN WITH LEFT-SIDED SCIATICA: ICD-10-CM

## 2022-08-23 PROCEDURE — 97012 MECHANICAL TRACTION THERAPY: CPT | Performed by: PHYSICAL THERAPIST

## 2022-08-23 NOTE — PROGRESS NOTES
Physical Therapy Daily Progress Note      Patient: Betty Mcmullen   : 1975  Diagnosis/ICD-10 Code:  Pain, neck [M54.2]  Referring practitioner: Self Referring  Date of Initial Visit: Type: THERAPY  Noted: 6/3/2022  Today's Date: 2022  Patient seen for 13 sessions             Subjective Continues to have radicular symptoms into UE.  Her back pain is mainly in her back and not as much into her LE.    Objective   See Exercise, Manual, and Modality Logs for complete treatment.       Assessment/Plan  Continues to respond well to both cervical and lumbar traction.    Progress per Plan of Care             Un-Timed:  Traction    30   mins 23829      Timed Treatment:   0   mins   Total Treatment:     30   mins        Nathan Almonte PTA  Physical Therapist Assistant

## 2022-09-12 ENCOUNTER — TREATMENT (OUTPATIENT)
Dept: PHYSICAL THERAPY | Facility: CLINIC | Age: 47
End: 2022-09-12

## 2022-09-12 DIAGNOSIS — G89.29 CHRONIC LEFT-SIDED LOW BACK PAIN WITH LEFT-SIDED SCIATICA: ICD-10-CM

## 2022-09-12 DIAGNOSIS — M54.42 CHRONIC LEFT-SIDED LOW BACK PAIN WITH LEFT-SIDED SCIATICA: ICD-10-CM

## 2022-09-12 DIAGNOSIS — M54.2 PAIN, NECK: Primary | ICD-10-CM

## 2022-09-12 PROCEDURE — 20561 NDL INSJ W/O NJX 3+ MUSC: CPT | Performed by: PHYSICAL THERAPIST

## 2022-09-12 PROCEDURE — 97140 MANUAL THERAPY 1/> REGIONS: CPT | Performed by: PHYSICAL THERAPIST

## 2022-09-12 NOTE — PROGRESS NOTES
Physical Therapy Daily Treatment Note  Visit: 14    Betty Mcmullen reports: her neck pain is improved and would like to focus treatment on her lower back today.  Reports LBP improved but present more on R today.   YOB: 1975  Referring practitioner : Self Referring  Date of Initial: Type: THERAPY  Noted: 6/3/2022  Today's date: 9/12/2022  Patient seen for 14 sessions    Visit Diagnoses:    ICD-10-CM ICD-9-CM   1. Pain, neck  M54.2 723.1   2. Chronic left-sided low back pain with left-sided sciatica  M54.42 724.2    G89.29 724.3     338.29       Subjective       Objective   See Exercise, Manual, and Modality Logs for complete treatment.       Assessment/Plan     Decreased paraspinal guarding, improved lumbar PIVM and pain post treatment.     Plan:  Continue current           Timed:         Manual Therapy:    28     mins  99162;       Un-Timed:  Dry Needling     10     mins self-pay      Timed Treatment:   28   mins   Total Treatment:     38   mins         Iban Rowell PT, DPT  Physical Therapist  [place] Lic #[id number]

## 2022-09-21 ENCOUNTER — TREATMENT (OUTPATIENT)
Dept: PHYSICAL THERAPY | Facility: CLINIC | Age: 47
End: 2022-09-21

## 2022-09-21 DIAGNOSIS — M54.2 PAIN, NECK: Primary | ICD-10-CM

## 2022-09-21 DIAGNOSIS — M54.42 CHRONIC LEFT-SIDED LOW BACK PAIN WITH LEFT-SIDED SCIATICA: ICD-10-CM

## 2022-09-21 DIAGNOSIS — G89.29 CHRONIC LEFT-SIDED LOW BACK PAIN WITH LEFT-SIDED SCIATICA: ICD-10-CM

## 2022-09-21 PROCEDURE — 97012 MECHANICAL TRACTION THERAPY: CPT | Performed by: PHYSICAL THERAPIST

## 2022-09-21 PROCEDURE — 20561 NDL INSJ W/O NJX 3+ MUSC: CPT | Performed by: PHYSICAL THERAPIST

## 2022-09-21 PROCEDURE — 97140 MANUAL THERAPY 1/> REGIONS: CPT | Performed by: PHYSICAL THERAPIST

## 2022-09-21 NOTE — PROGRESS NOTES
"Physical Therapy Daily Treatment Note  Visit: 15    Betty Mcmullen reports: improving overall with some increased lower back soreness today - feels \"compressed\".   Reports has pain management consultation next week to discuss additional treatment options.   YOB: 1975  Referring practitioner : Self Referring  Date of Initial: Type: THERAPY  Noted: 6/3/2022  Today's date: 9/21/2022  Patient seen for 15 sessions    Visit Diagnoses:    ICD-10-CM ICD-9-CM   1. Pain, neck  M54.2 723.1   2. Chronic left-sided low back pain with left-sided sciatica  M54.42 724.2    G89.29 724.3     338.29       Subjective       Objective   See Exercise, Manual, and Modality Logs for complete treatment.       Assessment/Plan    Decreased paraspinal guarding and improved ROM into rotation post tx. Notes relief from traction.              Timed:         Manual Therapy:    15     mins  59072;        Un-Timed:  Dry Needling     8     mins self-pay  Traction     18     mins 41705      Timed Treatment:   15   mins   Total Treatment:     41   mins         Iban Rowell PT, DPT  Physical Therapist  [place] Lic #[id number]  "

## 2022-09-23 ENCOUNTER — TRANSCRIBE ORDERS (OUTPATIENT)
Dept: ADMINISTRATIVE | Facility: HOSPITAL | Age: 47
End: 2022-09-23

## 2022-09-23 DIAGNOSIS — Z12.31 SCREENING MAMMOGRAM FOR BREAST CANCER: Primary | ICD-10-CM

## 2022-09-26 ENCOUNTER — TREATMENT (OUTPATIENT)
Dept: PHYSICAL THERAPY | Facility: CLINIC | Age: 47
End: 2022-09-26

## 2022-09-26 DIAGNOSIS — M54.42 CHRONIC LEFT-SIDED LOW BACK PAIN WITH LEFT-SIDED SCIATICA: Primary | ICD-10-CM

## 2022-09-26 DIAGNOSIS — M54.2 PAIN, NECK: ICD-10-CM

## 2022-09-26 DIAGNOSIS — G89.29 CHRONIC LEFT-SIDED LOW BACK PAIN WITH LEFT-SIDED SCIATICA: Primary | ICD-10-CM

## 2022-09-26 PROCEDURE — 97012 MECHANICAL TRACTION THERAPY: CPT | Performed by: PHYSICAL THERAPIST

## 2022-09-26 PROCEDURE — 97140 MANUAL THERAPY 1/> REGIONS: CPT | Performed by: PHYSICAL THERAPIST

## 2022-09-26 PROCEDURE — 97110 THERAPEUTIC EXERCISES: CPT | Performed by: PHYSICAL THERAPIST

## 2022-09-26 NOTE — PROGRESS NOTES
Physical Therapy Daily Treatment Note  Visit: 16    Betty Mcmullen reports: LBP better with traction.  Reports some increased stiffness/soreness in her neck today and would like to treat.   YOB: 1975  Referring practitioner : Self Referring  Date of Initial: Type: THERAPY  Noted: 6/3/2022  Today's date: 9/26/2022  Patient seen for 16 sessions    Visit Diagnoses:    ICD-10-CM ICD-9-CM   1. Chronic left-sided low back pain with left-sided sciatica  M54.42 724.2    G89.29 724.3     338.29   2. Pain, neck  M54.2 723.1       Subjective       Objective   See Exercise, Manual, and Modality Logs for complete treatment.       Assessment/Plan    Improved cervical and upper thoracic rotation and pain post tx.            Timed:         Manual Therapy:    16     mins  71112;     Therapeutic Exercise:    8     mins  84362;         Un-Timed:  Traction     15     mins 62078      Timed Treatment:   24   mins   Total Treatment:     39   mins         Iban Rowell PT, DPT  Physical Therapist  [place] Lic #[id number]

## 2022-10-05 ENCOUNTER — TREATMENT (OUTPATIENT)
Dept: PHYSICAL THERAPY | Facility: CLINIC | Age: 47
End: 2022-10-05

## 2022-10-05 DIAGNOSIS — M54.2 PAIN, NECK: ICD-10-CM

## 2022-10-05 DIAGNOSIS — G89.29 CHRONIC LEFT-SIDED LOW BACK PAIN WITH LEFT-SIDED SCIATICA: Primary | ICD-10-CM

## 2022-10-05 DIAGNOSIS — M54.42 CHRONIC LEFT-SIDED LOW BACK PAIN WITH LEFT-SIDED SCIATICA: Primary | ICD-10-CM

## 2022-10-05 PROCEDURE — 97012 MECHANICAL TRACTION THERAPY: CPT | Performed by: PHYSICAL THERAPIST

## 2022-10-05 NOTE — PROGRESS NOTES
Physical Therapy Daily Progress Note      Patient: Betty Mcmullen   : 1975  Diagnosis/ICD-10 Code:  No primary diagnosis found.  Referring practitioner: Self Referring  Date of Initial Visit: No linked episodes  Today's Date: 10/5/2022  Patient seen for Visit count could not be calculated. Make sure you are using a visit which is associated with an episode. sessions             Subjective Pt reports her LB has been feeling stiff/tight and would like to have lumbar traction performed today.    Objective   See Exercise, Manual, and Modality Logs for complete treatment.       Assessment/Plan  Pt responded well to lumbar traction well today, not having any adverse reactions.    Progress per Plan of Care               Un-Timed:  Traction     15     mins 05269      Timed Treatment:   0   mins   Total Treatment:     15   mins        Nathan Almonte PTA  Physical Therapist Assistant

## 2022-10-12 ENCOUNTER — TREATMENT (OUTPATIENT)
Dept: PHYSICAL THERAPY | Facility: CLINIC | Age: 47
End: 2022-10-12

## 2022-10-12 DIAGNOSIS — M54.2 PAIN, NECK: ICD-10-CM

## 2022-10-12 DIAGNOSIS — M54.42 CHRONIC LEFT-SIDED LOW BACK PAIN WITH LEFT-SIDED SCIATICA: Primary | ICD-10-CM

## 2022-10-12 DIAGNOSIS — G89.29 CHRONIC LEFT-SIDED LOW BACK PAIN WITH LEFT-SIDED SCIATICA: Primary | ICD-10-CM

## 2022-10-12 PROCEDURE — 97014 ELECTRIC STIMULATION THERAPY: CPT | Performed by: PHYSICAL THERAPIST

## 2022-10-12 PROCEDURE — 97012 MECHANICAL TRACTION THERAPY: CPT | Performed by: PHYSICAL THERAPIST

## 2022-10-12 PROCEDURE — 97140 MANUAL THERAPY 1/> REGIONS: CPT | Performed by: PHYSICAL THERAPIST

## 2022-10-12 NOTE — PROGRESS NOTES
Physical Therapy Daily Progress Note      Patient: Betty Mcmullen   : 1975  Diagnosis/ICD-10 Code:  Chronic left-sided low back pain with left-sided sciatica [M54.42, G89.29]  Referring practitioner: Self Referring  Date of Initial Visit: Type: THERAPY  Noted: 6/3/2022  Today's Date: 10/12/2022  Patient seen for 18 sessions             Subjective Pt states she had pain management MD visit Monday and they discussed getting epidural injections.  Pt is hesitant to receive these injections but is also waiting to see if her insurance will cover them.  Pt notes having tightness in her LB.  She also notes having tightness in her right UT and would like to try electrical stimulation to that area.    Objective   See Exercise, Manual, and Modality Logs for complete treatment.       Assessment/Plan  Pt continues to respond well to lumbar mechanical traction.  Mild/mod muscle guarding noted in pt's right UT with STM.  Muscle guarding and c/o TTP reduction were noted by end of manual techniques.  Pt also responded well to e-stim and MH to cervical region.    Progress per Plan of Care           Timed:         Manual Therapy:    15     mins  61730;         Un-Timed:  Electrical Stimulation:   15    mins  12311 ( );  Traction     15     mins 50178      Timed Treatment:   15   mins   Total Treatment:     45   mins        Nathan Almonte PTA  Physical Therapist Assistant

## 2022-10-21 ENCOUNTER — TREATMENT (OUTPATIENT)
Dept: PHYSICAL THERAPY | Facility: CLINIC | Age: 47
End: 2022-10-21

## 2022-10-21 DIAGNOSIS — G89.29 CHRONIC LEFT-SIDED LOW BACK PAIN WITH LEFT-SIDED SCIATICA: Primary | ICD-10-CM

## 2022-10-21 DIAGNOSIS — M54.2 PAIN, NECK: ICD-10-CM

## 2022-10-21 DIAGNOSIS — M54.42 CHRONIC LEFT-SIDED LOW BACK PAIN WITH LEFT-SIDED SCIATICA: Primary | ICD-10-CM

## 2022-10-21 PROCEDURE — 97110 THERAPEUTIC EXERCISES: CPT | Performed by: PHYSICAL THERAPIST

## 2022-10-21 PROCEDURE — 20561 NDL INSJ W/O NJX 3+ MUSC: CPT | Performed by: PHYSICAL THERAPIST

## 2022-10-21 PROCEDURE — 97140 MANUAL THERAPY 1/> REGIONS: CPT | Performed by: PHYSICAL THERAPIST

## 2022-10-21 NOTE — PROGRESS NOTES
Physical Therapy Daily Treatment Note  Visit: 19    Betty Mcmullen reports: some tightness in her lower back and right side of her neck today.  Reports ran 4 miles this morning and would like to resume doing this more frequently if able to per her back pain.   Reports also waiting on authorization for lumbar epidural but undecided if she will receive per prior bad experience.   YOB: 1975  Referring practitioner : Self Referring  Date of Initial: Type: THERAPY  Noted: 6/3/2022  Today's date: 10/21/2022  Patient seen for 19 sessions    Visit Diagnoses:    ICD-10-CM ICD-9-CM   1. Chronic left-sided low back pain with left-sided sciatica  M54.42 724.2    G89.29 724.3     338.29   2. Pain, neck  M54.2 723.1       Subjective       Objective   See Exercise, Manual, and Modality Logs for complete treatment.       Assessment/Plan     Cervical RR AROM limited and moderate tenderness R upper trapezius.  Decreased pain noted with manual therapy to cervical spine and lumbar spine and dry needling.     Plan:    Continue current           Timed:         Manual Therapy:    21     mins  86494;     Therapeutic Exercise:    9     mins  65155;         Un-Timed:  Dry Needling     10     mins self-pay      Timed Treatment:   30   mins   Total Treatment:     40   mins         Iban Rowell PT, DPT  Physical Therapist  [place] Lic #[id number]

## 2022-11-16 ENCOUNTER — TREATMENT (OUTPATIENT)
Dept: PHYSICAL THERAPY | Facility: CLINIC | Age: 47
End: 2022-11-16

## 2022-11-16 DIAGNOSIS — M54.42 CHRONIC LEFT-SIDED LOW BACK PAIN WITH LEFT-SIDED SCIATICA: Primary | ICD-10-CM

## 2022-11-16 DIAGNOSIS — G89.29 CHRONIC LEFT-SIDED LOW BACK PAIN WITH LEFT-SIDED SCIATICA: Primary | ICD-10-CM

## 2022-11-16 DIAGNOSIS — M54.2 PAIN, NECK: ICD-10-CM

## 2022-11-16 PROCEDURE — 97140 MANUAL THERAPY 1/> REGIONS: CPT | Performed by: PHYSICAL THERAPIST

## 2022-11-16 PROCEDURE — 97110 THERAPEUTIC EXERCISES: CPT | Performed by: PHYSICAL THERAPIST

## 2022-11-16 NOTE — PROGRESS NOTES
Physical Therapy Daily Treatment Note  Visit: 20    Betty Mcmullen reports: she did well with recent vacation during extended hiking/walking with low pain levels in her lower back.  Reports has continued to have tingling in both UE intermittently but less than prior.  Had consult with pain management with   YOB: 1975  Referring practitioner : Self Referring  Date of Initial: Type: THERAPY  Noted: 6/3/2022  Today's date: 11/16/2022  Patient seen for 20 sessions    Visit Diagnoses:    ICD-10-CM ICD-9-CM   1. Chronic left-sided low back pain with left-sided sciatica  M54.42 724.2    G89.29 724.3     338.29   2. Pain, neck  M54.2 723.1       Subjective       Objective   See Exercise, Manual, and Modality Logs for complete treatment.       Assessment/Plan    Improved cervical rotation and shoulder FL with exercise progression.  Still noted pain relief with manual/mechanical traction and educated may benefit from home cervical traction unit after completing course of PT.     Plan:    Continue            Timed:         Manual Therapy:    25     mins  25585;     Therapeutic Exercise:    15     mins  06630;         Timed Treatment:   40   mins   Total Treatment:     40   mins         Iban Rowell PT, DPT  Physical Therapist  [place] Lic #[id number]

## 2022-11-30 ENCOUNTER — TREATMENT (OUTPATIENT)
Dept: PHYSICAL THERAPY | Facility: CLINIC | Age: 47
End: 2022-11-30

## 2022-11-30 DIAGNOSIS — M54.2 PAIN, NECK: ICD-10-CM

## 2022-11-30 DIAGNOSIS — M54.42 CHRONIC LEFT-SIDED LOW BACK PAIN WITH LEFT-SIDED SCIATICA: Primary | ICD-10-CM

## 2022-11-30 DIAGNOSIS — G89.29 CHRONIC LEFT-SIDED LOW BACK PAIN WITH LEFT-SIDED SCIATICA: Primary | ICD-10-CM

## 2022-11-30 PROCEDURE — PTSPVT PR CUSTOM PT TREATMENT OF SELF PAY PATIENT: Performed by: PHYSICAL THERAPIST

## 2022-12-01 NOTE — PROGRESS NOTES
Physical Therapy Daily Treatment Note  Visit: 21    Betty Mcmullen reports: some soreness in her lower back and neck today.  Reports continues to intermittently have tingling in both hands and has increased soreness in the right side of her lower back today.  Reports she is scheduled to receive epidurals in lower back later this year.   YOB: 1975  Referring practitioner : Self Referring  Date of Initial: Type: THERAPY  Noted: 6/3/2022  Today's date: 12/1/2022  Patient seen for 21 sessions    Visit Diagnoses:    ICD-10-CM ICD-9-CM   1. Chronic left-sided low back pain with left-sided sciatica  M54.42 724.2    G89.29 724.3     338.29   2. Pain, neck  M54.2 723.1       Subjective       Objective   See Exercise, Manual, and Modality Logs for complete treatment.       Assessment/Plan     Improved cervical AROM post manual therapy and lumbar and hip ROM and pain post manual therapy post pelvic repositioning exercise.  Good understanding of HEP.     Plan:    Continue current           Timed:         Manual Therapy:    25     mins  67325;     Therapeutic Exercise:    5     mins  30542;         Un-Timed:  Traction     15     mins 82540      Timed Treatment:   30   mins   Total Treatment:     45   mins         Iban Rowell PT, DPT  Physical Therapist  [place] Lic #[id number]

## 2022-12-07 ENCOUNTER — TREATMENT (OUTPATIENT)
Dept: PHYSICAL THERAPY | Facility: CLINIC | Age: 47
End: 2022-12-07

## 2022-12-07 PROCEDURE — PTSPMIN1 PR PHYS THER SP UP TO 15 MINUTES: Performed by: PHYSICAL THERAPIST

## 2022-12-07 NOTE — PROGRESS NOTES
Physical Therapy Daily Progress Note      Patient: Betty Mcmullen   : 1975  Diagnosis/ICD-10 Code:  No primary diagnosis found.  Referring practitioner: Self Referring  Date of Initial Visit: No linked episodes  Today's Date: 2022  Patient seen for Visit count could not be calculated. Make sure you are using a visit which is associated with an episode. sessions             Subjective Pt reports she continues to have c/o tightness in her back.  Pt states she is scheduled to have an epidural tomorrow morning.  Pt would like to only have lumbar traction due to being pressed for time and is self pay at the moment.  Objective   See Exercise, Manual, and Modality Logs for complete treatment.       Assessment/Plan  Pt continues to tolerate mechanical lumbar traction well.  Assess response to epidural next visit.    Progress per Plan of Care             Un-Timed:  Traction    15     mins 48686      Timed Treatment:   15   mins   Total Treatment:     15   mins        Iban Rowell PT  Physical Therapist Assistant

## 2022-12-14 ENCOUNTER — TREATMENT (OUTPATIENT)
Dept: PHYSICAL THERAPY | Facility: CLINIC | Age: 47
End: 2022-12-14

## 2022-12-14 DIAGNOSIS — G89.29 CHRONIC LEFT-SIDED LOW BACK PAIN WITH LEFT-SIDED SCIATICA: Primary | ICD-10-CM

## 2022-12-14 DIAGNOSIS — M54.2 PAIN, NECK: ICD-10-CM

## 2022-12-14 DIAGNOSIS — M54.42 CHRONIC LEFT-SIDED LOW BACK PAIN WITH LEFT-SIDED SCIATICA: Primary | ICD-10-CM

## 2022-12-14 PROCEDURE — PTSPVT PR CUSTOM PT TREATMENT OF SELF PAY PATIENT: Performed by: PHYSICAL THERAPIST

## 2022-12-15 NOTE — PROGRESS NOTES
Physical Therapy Daily Treatment Note  Visit: 22    Betty Mcmullen reports: had initial lumbar injection and felt may have been helpful initially.  Reports she did LE lifting workout several days ago which had made both her legs and lower back more sore.   YOB: 1975  Referring practitioner : Self Referring  Date of Initial: Type: THERAPY  Noted: 6/3/2022  Today's date: 12/15/2022  Patient seen for 22 sessions    Visit Diagnoses:    ICD-10-CM ICD-9-CM   1. Chronic left-sided low back pain with left-sided sciatica  M54.42 724.2    G89.29 724.3     338.29   2. Pain, neck  M54.2 723.1       Subjective       Objective   See Exercise, Manual, and Modality Logs for complete treatment.       Assessment/Plan   Consistent improvement in cervical/lumbar ROM and pain levels post manual treatments.  Reviewed workout activities to minimize increased neck/lumbar pain.     Plan:  Continue current           Timed:         Manual Therapy:    38     mins  67895;     Therapeutic Exercise:    5     mins  66745;         Timed Treatment:   43   mins   Total Treatment:     43   mins         Iban Rowell PT, DPT  Physical Therapist  [place] Lic #[id number]

## 2023-01-04 ENCOUNTER — TREATMENT (OUTPATIENT)
Dept: PHYSICAL THERAPY | Facility: CLINIC | Age: 48
End: 2023-01-04
Payer: COMMERCIAL

## 2023-01-04 DIAGNOSIS — M54.42 CHRONIC LEFT-SIDED LOW BACK PAIN WITH LEFT-SIDED SCIATICA: Primary | ICD-10-CM

## 2023-01-04 DIAGNOSIS — M54.2 PAIN, NECK: ICD-10-CM

## 2023-01-04 DIAGNOSIS — G89.29 CHRONIC LEFT-SIDED LOW BACK PAIN WITH LEFT-SIDED SCIATICA: Primary | ICD-10-CM

## 2023-01-04 PROCEDURE — 97140 MANUAL THERAPY 1/> REGIONS: CPT | Performed by: PHYSICAL THERAPIST

## 2023-01-04 NOTE — PROGRESS NOTES
Physical Therapy Daily Treatment Note  Visit: 23    Betty Mcmullen reports: feels injection may have helped her back as she is feeling better.  Able to complete workout for LE with little back pain and UE symptoms very minimal now.   YOB: 1975  Referring practitioner : Self Referring  Date of Initial: Type: THERAPY  Noted: 6/3/2022  Today's date: 1/4/2023  Patient seen for 23 sessions    Visit Diagnoses:    ICD-10-CM ICD-9-CM   1. Chronic left-sided low back pain with left-sided sciatica  M54.42 724.2    G89.29 724.3     338.29   2. Pain, neck  M54.2 723.1       Subjective       Objective   See Exercise, Manual, and Modality Logs for complete treatment.       Assessment/Plan     Progressing well with improved lumbar/cervical ROM and decreased radicular symptoms into R UE and B LE.      Plan:  Continue current           Timed:         Manual Therapy:    30     mins  49049;     Therapeutic Exercise:    5     mins  09844;         Timed Treatment:   35   mins   Total Treatment:     35   mins         Iban Rowell PT, DPT  Physical Therapist  [place] Lic #[id number]

## 2023-01-11 ENCOUNTER — TREATMENT (OUTPATIENT)
Dept: PHYSICAL THERAPY | Facility: CLINIC | Age: 48
End: 2023-01-11
Payer: COMMERCIAL

## 2023-01-11 DIAGNOSIS — G89.29 CHRONIC LEFT-SIDED LOW BACK PAIN WITH LEFT-SIDED SCIATICA: Primary | ICD-10-CM

## 2023-01-11 DIAGNOSIS — M54.2 PAIN, NECK: ICD-10-CM

## 2023-01-11 DIAGNOSIS — M54.42 CHRONIC LEFT-SIDED LOW BACK PAIN WITH LEFT-SIDED SCIATICA: Primary | ICD-10-CM

## 2023-01-11 PROCEDURE — 97140 MANUAL THERAPY 1/> REGIONS: CPT | Performed by: PHYSICAL THERAPIST

## 2023-01-11 PROCEDURE — 97012 MECHANICAL TRACTION THERAPY: CPT | Performed by: PHYSICAL THERAPIST

## 2023-01-11 NOTE — PROGRESS NOTES
Physical Therapy Daily Progress Note      Patient: Betty Mcmullen   : 1975  Diagnosis/ICD-10 Code:  Chronic left-sided low back pain with left-sided sciatica [M54.42, G89.29]  Referring practitioner: Self Referring  Date of Initial Visit: Type: THERAPY  Noted: 6/3/2022  Today's Date: 2023  Patient seen for 24 sessions             Subjective Pt reports tightness in her right UT and a little tightness in her LB today.  She feels that the UT issues stem from work tension.  She still perceives the injections has been beneficial overall.    Objective   See Exercise, Manual, and Modality Logs for complete treatment.       Assessment/Plan  Mild/mod muscle guarding noted in right UT.  Pt perceived decreased tension in area after manual techniques.  Continues to respond well to mechanical traction.    Progress per Plan of Care           Timed:         Manual Therapy:    25     mins  21111;         Un-Timed:  Traction     15     mins 23731      Timed Treatment:   25   mins   Total Treatment:     40   mins        Nathan Almonte PTA  Physical Therapist Assistant

## 2023-01-26 ENCOUNTER — TREATMENT (OUTPATIENT)
Dept: PHYSICAL THERAPY | Facility: CLINIC | Age: 48
End: 2023-01-26
Payer: COMMERCIAL

## 2023-01-26 DIAGNOSIS — G89.29 CHRONIC LEFT-SIDED LOW BACK PAIN WITH LEFT-SIDED SCIATICA: Primary | ICD-10-CM

## 2023-01-26 DIAGNOSIS — M54.42 CHRONIC LEFT-SIDED LOW BACK PAIN WITH LEFT-SIDED SCIATICA: Primary | ICD-10-CM

## 2023-01-26 DIAGNOSIS — M54.2 PAIN, NECK: ICD-10-CM

## 2023-01-26 PROCEDURE — 97140 MANUAL THERAPY 1/> REGIONS: CPT | Performed by: PHYSICAL THERAPIST

## 2023-01-26 PROCEDURE — 97012 MECHANICAL TRACTION THERAPY: CPT | Performed by: PHYSICAL THERAPIST

## 2023-01-26 NOTE — PROGRESS NOTES
Physical Therapy Daily Progress Note      Patient: Betty Mcmullen   : 1975  Diagnosis/ICD-10 Code:  Chronic left-sided low back pain with left-sided sciatica [M54.42, G89.29]  Referring practitioner: Self Referring  Date of Initial Visit: Type: THERAPY  Noted: 6/3/2022  Today's Date: 2023  Patient seen for 25 sessions             Subjective Pt reports her back is getting better but she still has issues with it most notably when she is working out.    Objective   See Exercise, Manual, and Modality Logs for complete treatment.       Assessment/Plan  Continues to respond well to lumbar mechanical traction and also to manual techniques to UT's (R>L).    Progress per Plan of Care           Timed:         Manual Therapy:    25     mins  90187;         Un-Timed:  Traction     15     mins 69972      Timed Treatment:   40   mins   Total Treatment:     40   mins        Nathan Almonte PTA  Physical Therapist Assistant

## 2023-02-01 ENCOUNTER — TREATMENT (OUTPATIENT)
Dept: PHYSICAL THERAPY | Facility: CLINIC | Age: 48
End: 2023-02-01
Payer: COMMERCIAL

## 2023-02-01 DIAGNOSIS — G89.29 CHRONIC LEFT-SIDED LOW BACK PAIN WITH LEFT-SIDED SCIATICA: Primary | ICD-10-CM

## 2023-02-01 DIAGNOSIS — M54.42 CHRONIC LEFT-SIDED LOW BACK PAIN WITH LEFT-SIDED SCIATICA: Primary | ICD-10-CM

## 2023-02-01 DIAGNOSIS — M54.2 PAIN, NECK: ICD-10-CM

## 2023-02-01 PROCEDURE — 97140 MANUAL THERAPY 1/> REGIONS: CPT | Performed by: PHYSICAL THERAPIST

## 2023-02-01 PROCEDURE — 97012 MECHANICAL TRACTION THERAPY: CPT | Performed by: PHYSICAL THERAPIST

## 2023-02-01 NOTE — PROGRESS NOTES
Physical Therapy Daily Progress Note      Patient: Betty Mcmullen   : 1975  Diagnosis/ICD-10 Code:  Chronic left-sided low back pain with left-sided sciatica [M54.42, G89.29]  Referring practitioner: Self Referring  Date of Initial Visit: Type: THERAPY  Noted: 6/3/2022  Today's Date: 2023  Patient seen for 26 sessions             Subjective Shoulders are feeling good today.  Low back is tight and wants to focus treatment in this area today.  She been scheduled for second injection.    Objective   See Exercise, Manual, and Modality Logs for complete treatment.       Assessment/Plan  Responds well to mechanical traction.  Seemed to respond well to manual techniques today, especially with overpressure stretching.    Progress per Plan of Care           Timed:         Manual Therapy:   25    mins  12710;         Un-Timed:  Traction     15     mins 08006      Timed Treatment:   25  mins   Total Treatment:     40   mins        Nathan Almonte PTA  Physical Therapist Assistant

## 2023-02-10 ENCOUNTER — TREATMENT (OUTPATIENT)
Dept: PHYSICAL THERAPY | Facility: CLINIC | Age: 48
End: 2023-02-10
Payer: COMMERCIAL

## 2023-02-10 DIAGNOSIS — G89.29 CHRONIC LEFT-SIDED LOW BACK PAIN WITH LEFT-SIDED SCIATICA: Primary | ICD-10-CM

## 2023-02-10 DIAGNOSIS — M54.2 PAIN, NECK: ICD-10-CM

## 2023-02-10 DIAGNOSIS — M54.42 CHRONIC LEFT-SIDED LOW BACK PAIN WITH LEFT-SIDED SCIATICA: Primary | ICD-10-CM

## 2023-02-10 PROCEDURE — 97012 MECHANICAL TRACTION THERAPY: CPT | Performed by: PHYSICAL THERAPIST

## 2023-02-10 PROCEDURE — 97140 MANUAL THERAPY 1/> REGIONS: CPT | Performed by: PHYSICAL THERAPIST

## 2023-02-10 NOTE — PROGRESS NOTES
Physical Therapy Daily Progress Note      Patient: Betty Mcmullen   : 1975  Diagnosis/ICD-10 Code:  Chronic left-sided low back pain with left-sided sciatica [M54.42, G89.29]  Referring practitioner: Self Referring  Date of Initial Visit: Type: THERAPY  Noted: 6/3/2022  Today's Date: 2023  Patient seen for 27 sessions             Subjective Neck/shoulders are still feeling good.  Her LB is stiff and sore the past couple of days.  However, she slept in and rested on Wednesday.  She noticed that her back did not seem to bother her that day.  She is still waiting for approval to have her second injections.    Objective   See Exercise, Manual, and Modality Logs for complete treatment.       Assessment/Plan  Responded well to traction and manual techniques today, stating her back was not as sore afterwards.  Mild/mod TTP noted R>L lumbar PVM.    Progress per Plan of Care           Timed:         Manual Therapy:    30     mins  [id number];         Un-Timed:  Traction     15     mins [id number]      Timed Treatment:   30   mins   Total Treatment:     45   mins        Nathan Almonte PTA  Physical Therapist Assistant

## 2023-06-02 ENCOUNTER — OFFICE (AMBULATORY)
Dept: URBAN - METROPOLITAN AREA CLINIC 64 | Facility: CLINIC | Age: 48
End: 2023-06-02

## 2023-06-02 VITALS
HEART RATE: 69 BPM | WEIGHT: 139 LBS | HEIGHT: 64 IN | DIASTOLIC BLOOD PRESSURE: 67 MMHG | SYSTOLIC BLOOD PRESSURE: 99 MMHG

## 2023-06-02 DIAGNOSIS — K30 FUNCTIONAL DYSPEPSIA: ICD-10-CM

## 2023-06-02 DIAGNOSIS — R13.10 DYSPHAGIA, UNSPECIFIED: ICD-10-CM

## 2023-06-02 DIAGNOSIS — K59.00 CONSTIPATION, UNSPECIFIED: ICD-10-CM

## 2023-06-02 PROCEDURE — 99213 OFFICE O/P EST LOW 20 MIN: CPT | Performed by: NURSE PRACTITIONER

## 2023-06-02 RX ORDER — OMEPRAZOLE 20 MG/1
20 CAPSULE, DELAYED RELEASE ORAL
Qty: 90 | Refills: 3 | Status: ACTIVE
Start: 2023-06-02

## 2023-06-07 ENCOUNTER — TREATMENT (OUTPATIENT)
Dept: PHYSICAL THERAPY | Facility: CLINIC | Age: 48
End: 2023-06-07
Payer: COMMERCIAL

## 2023-06-07 DIAGNOSIS — M54.2 PAIN, NECK: ICD-10-CM

## 2023-06-07 DIAGNOSIS — G89.29 CHRONIC LEFT-SIDED LOW BACK PAIN WITH LEFT-SIDED SCIATICA: Primary | ICD-10-CM

## 2023-06-07 DIAGNOSIS — M54.42 CHRONIC LEFT-SIDED LOW BACK PAIN WITH LEFT-SIDED SCIATICA: Primary | ICD-10-CM

## 2023-06-07 PROCEDURE — 97140 MANUAL THERAPY 1/> REGIONS: CPT | Performed by: PHYSICAL THERAPIST

## 2023-06-07 PROCEDURE — 97110 THERAPEUTIC EXERCISES: CPT | Performed by: PHYSICAL THERAPIST

## 2023-06-10 NOTE — PROGRESS NOTES
Re-Assessment / Re-Certification        Patient: Betty Mcmullen   : 1975  Diagnosis/ICD-10 Code:  Chronic left-sided low back pain with left-sided sciatica [M54.42, G89.29]  Referring practitioner: Self Referring  Date of Initial Visit: Type: THERAPY  Noted: 6/3/2022  Today's Date: 6/10/2023  Patient seen for 28 sessions      Subjective:   Betty Mcmullen reports: recently had 2nd lumbar epidural which did not help her symptoms much.  Reports some increase in neck pain recently though less than prior.  Has continued with HEP and activity modifications but would like to be more active w/o increased LBP especially.   Subjective Questionnaire: QuickDASH/Oswestry - to complete next visit  Clinical Progress: improved  Home Program Compliance: Yes  Treatment has included: therapeutic exercise, neuromuscular re-education, manual therapy, therapeutic activity, electrical stimulation, traction, dry needling and moist heat    Subjective   Objective          Active Range of Motion   Cervical/Thoracic Spine   Cervical    Left lateral flexion: 30 degrees   Right lateral flexion: 22 degrees   Left rotation: 70 degrees   Right rotation: 65 degrees     Lumbar   Flexion: Active lumbar flexion: 40% restricted    Extension: Active lumbar extension: 30% restricted - painful.   Left lateral flexion: Active left lumbar lateral flexion: 10% limited -  pain free.   Right lateral flexion: Active right lumbar lateral flexion: 30% limited painful.     Additional Active Range of Motion Details  ENOC       Wide  SH FL  R 90   L80  SH ER R 80   L 70  SH IR   R 30   L 20  HP FL  R 90  L 80   HP ER R 30  L 35  HP IR  R 20  L10    Tests     Lumbar     Left   Negative quadrant.     Right   Positive quadrant.     Right Pelvic Girdle/Sacrum   Positive: sacrum compression.       Assessment & Plan     Assessment  Impairments: abnormal or restricted ROM, lacks appropriate home exercise program and pain with function  Functional  Limitations: carrying objects, lifting, walking and uncomfortable because of pain  Assessment details: Presents with improved but continued limits in neck/upper thoracic/lumbar AROM with pain greatest in R lower lumbar and SI joint.  Improvement both in ROM and pain noted with positional breathing activities and manual therapy.  Instructed and reviewed for HEP with plan to resume PT 1 x per week every 1-2 weeks.   Prognosis: good      Progress toward previous goals: All met or progressed towards    New Goals  Short-term goals (STG):   1. Neck and lumbar spine pain decreased to 5/10 at worst over 2 weeks. - Met prior - some recent exacerbation with LBP > 5/10 at times  2. Independent and compliant with HEP and activity modification instructions over 2 weeks. - Met  3. R UE symptoms to begin to centralize over 2 weeks. - Met    Long-term goals (LTG):   1. Neck and lumbar spine pain decreased to 3/10 at worst with prior level of activities over 8 weeks. - Progressed towards   2. Cervical and lumbar AROM all planes WFL w/o pain over 8 weeks. -Progressed towards   3. Negative neural tension testing in R UE and L LE over 8 weeks. - Met  4. To verbalize readiness for discharge over 10 weeks per improved symptoms and HEP independence. - Progressed towards                         Recommendations: Continue with recommendations to continue PT 1 x every 1-2 weeks for HEP progressions and modalities and manual therapy PRN for pain  Timeframe: 3 months  Prognosis to achieve goals: good    PT Signature: Iban Rowell, PT, DPT      Based upon review of the patient's progress and continued therapy plan, it is my medical opinion that Betty Mcmullen should continue physical therapy treatment at [facility]  [address] [place] [address] [phone].    Signature: __________________________________  Referring, Self    Timed:         Manual Therapy:    15     mins   [id number];     Therapeutic Exercise:    25     mins  [id number];         Timed Treatment:   40   mins   Total Treatment:     40   mins

## 2023-12-11 ENCOUNTER — TREATMENT (OUTPATIENT)
Dept: PHYSICAL THERAPY | Facility: CLINIC | Age: 48
End: 2023-12-11
Payer: COMMERCIAL

## 2023-12-11 DIAGNOSIS — M54.50 LUMBAR SPINE PAIN: ICD-10-CM

## 2023-12-11 DIAGNOSIS — M54.12 RADICULOPATHY, CERVICAL: Primary | ICD-10-CM

## 2023-12-11 PROCEDURE — 97162 PT EVAL MOD COMPLEX 30 MIN: CPT | Performed by: PHYSICAL THERAPIST

## 2023-12-11 PROCEDURE — 97140 MANUAL THERAPY 1/> REGIONS: CPT | Performed by: PHYSICAL THERAPIST

## 2023-12-11 NOTE — PROGRESS NOTES
Physical Therapy Initial Evaluation and Plan of Care    Patient: Betty Mcmullen   : 1975  Diagnosis/ICD-10 Code:  Radiculopathy, cervical [M54.12]  Referring practitioner: Self Referring  Date of initial visit : 2023  Today's Date: 2023  Patient seen for 1  session    Visit Diagnoses:    ICD-10-CM ICD-9-CM   1. Radiculopathy, cervical  M54.12 723.4   2. Lumbar spine pain  M54.50 724.2        Subjective Evaluation    History of Present Illness  Mechanism of injury: Patient is 48 year old female who presents with a diagnosis of lumbar spine and cervical spine pain.  Notes tingling in both arms throughout the days and at night with some difficulty getting symptoms to decrease.  Also notes pain in her lower back and legs especially with sitting, prolonged positioning and sensation of tightness. Prior history of PT including traction and HEP with improvement in symptoms in addition to lumbar epidurals which helped some. Is active and would like to continue exercising but find activities which do not make her feel worse afterwards.     Subjective comment: Revised Oswestry - 34%  Patient Occupation:  Quality of life: good    Pain  Current pain ratin  At best pain rating: 3  At worst pain rating: 10  Pain location: B UE, lower back.  Quality: dull ache and burning  Relieving factors: change in position, medications, heat and ice  Aggravating factors: squatting, lifting, prolonged positioning, sleeping, standing and repetitive movement  Progression: worsening    Social Support  Lives in: multiple-level home  Lives with: young children           Objective          Palpation   Left   Hypertonic in the erector spinae and quadratus lumborum.   Tenderness of the erector spinae.     Right   Hypertonic in the erector spinae and quadratus lumborum. Tenderness of the erector spinae and quadratus lumborum.     Active Range of Motion   Cervical/Thoracic Spine   Cervical    Left lateral  flexion: 22 degrees   Right lateral flexion: 15 degrees with pain  Left rotation: 65 degrees   Right rotation: 60 degrees     Lumbar   Flexion: Active lumbar flexion: 50% limits with pain.   Extension: Active lumbar extension: 40% limits - pain free.   Left Hip   Flexion: 75 degrees   External rotation (90/90): 45 degrees   Internal rotation (90/90): 8 degrees     Right Hip   Flexion: 50 degrees   External rotation (90/90): 35 degrees   Internal rotation (90/90): 15 degrees     Strength/Myotome Testing   Cervical Spine     Left   Normal strength    Right   Normal strength    Lumbar   Left   Normal strength    Right   Normal strength    Tests   Cervical     Left   Negative Spurling's sign, ULTT1, ULTT2 and ULTT3.     Right   Positive Spurling's sign, ULTT1 and ULTT2.   Negative ULTT3.     Additional Tests Details  Negative slump/SLR, + repeated FL for pain          Assessment & Plan       Assessment  Impairments: abnormal or restricted ROM, lacks appropriate home exercise program and pain with function   Functional limitations: carrying objects, lifting, walking, pulling, pushing, uncomfortable because of pain, sitting and unable to perform repetitive tasks   Assessment details: Presents with limits in cervical/thoracic/lumbar spine with pain with cervical SB R along with positive Spurling's/cervical distraction and positive lumbar FB repeated motion along with limits in B hip ROM (R more limited). Positive response to episode last year with similar symptoms and would benefit from skilled PT to address.   Prognosis: good    Goals  Plan Goals: ST. Cervical spine rotation AROM improved 5-8 degrees or greater to R without R UE symptoms over 3 weeks.   2. Independent and compliant with HEP over 3 weeks.   3. Improved R hip AROM ER/IR AROM 10 degrees or greater over 3 weeks w/o increased LBP.     LT. Revised Oswestry and NDI 10% or less over 12 weeks.   2. To report 75% or greater decrease in neck and B UE  symptoms over 12 weeks.   3. To report 50% or greater decrease in lower back with ADL's and workout activity over 12 weeks.   4. To verbalize readiness for discharge per improved symptoms and HEP progression and independence over 12 weeks.     Plan  Therapy options: will be seen for skilled therapy services  Planned modality interventions: cryotherapy, dry needling, electrical stimulation/Russian stimulation, TENS, thermotherapy (hydrocollator packs) and traction  Planned therapy interventions: manual therapy, neuromuscular re-education, soft tissue mobilization, flexibility, spinal/joint mobilization, strengthening, functional ROM exercises, stretching, therapeutic activities, transfer training and joint mobilization  Frequency: 1x week  Duration in weeks: 12  Treatment plan discussed with: patient    History # of Personal Factors and/or Comorbidities: MODERATE (1-2)  Examination of Body System(s): # of elements: MODERATE (3)  Clinical Presentation: EVOLVING  Clinical Decision Making: MODERATE       Timed:         Manual Therapy:    12     mins  98417;     Therapeutic Exercise:    5     mins  [id number];         Un-Timed:  Mod Eval     30     Mins  [id number]        Timed Treatment:   17   mins   Total Treatment:     47   mins          PT SIGNATURE: Iban Rowell PT, DPT   [place] Lic #[id number]    DATE TREATMENT INITIATED: 12/11/2023    Initial Certification  Certification Period: 3/10/2024  I certify that the therapy services are furnished while this patient is under my care.  The services outlined above are required by this patient, and will be reviewed every 90 days.     PHYSICIAN: Referring, Self      DATE:     Please sign and return via fax to [phone].. Thank you, [facility].

## 2024-11-21 ENCOUNTER — OFFICE (AMBULATORY)
Age: 49
End: 2024-11-21

## 2024-11-21 ENCOUNTER — OFFICE (AMBULATORY)
Dept: URBAN - METROPOLITAN AREA CLINIC 64 | Facility: CLINIC | Age: 49
End: 2024-11-21

## 2024-11-21 VITALS
HEART RATE: 61 BPM | HEART RATE: 61 BPM | HEIGHT: 64 IN | HEART RATE: 61 BPM | WEIGHT: 140 LBS | DIASTOLIC BLOOD PRESSURE: 66 MMHG | DIASTOLIC BLOOD PRESSURE: 66 MMHG | HEIGHT: 64 IN | SYSTOLIC BLOOD PRESSURE: 105 MMHG | SYSTOLIC BLOOD PRESSURE: 105 MMHG | DIASTOLIC BLOOD PRESSURE: 66 MMHG | DIASTOLIC BLOOD PRESSURE: 66 MMHG | SYSTOLIC BLOOD PRESSURE: 105 MMHG | SYSTOLIC BLOOD PRESSURE: 105 MMHG | SYSTOLIC BLOOD PRESSURE: 105 MMHG | HEIGHT: 64 IN | WEIGHT: 140 LBS | HEIGHT: 64 IN | HEIGHT: 64 IN | WEIGHT: 140 LBS | HEIGHT: 64 IN | SYSTOLIC BLOOD PRESSURE: 105 MMHG | WEIGHT: 140 LBS | WEIGHT: 140 LBS | WEIGHT: 140 LBS | HEIGHT: 64 IN | HEART RATE: 61 BPM | DIASTOLIC BLOOD PRESSURE: 66 MMHG | DIASTOLIC BLOOD PRESSURE: 66 MMHG | HEART RATE: 61 BPM | WEIGHT: 140 LBS | DIASTOLIC BLOOD PRESSURE: 66 MMHG | HEART RATE: 61 BPM | HEART RATE: 61 BPM | SYSTOLIC BLOOD PRESSURE: 105 MMHG

## 2024-11-21 DIAGNOSIS — K30 FUNCTIONAL DYSPEPSIA: ICD-10-CM

## 2024-11-21 DIAGNOSIS — Z86.0100 PERSONAL HISTORY OF COLON POLYPS, UNSPECIFIED: ICD-10-CM

## 2024-11-21 DIAGNOSIS — K59.00 CONSTIPATION, UNSPECIFIED: ICD-10-CM

## 2024-11-21 PROCEDURE — 99213 OFFICE O/P EST LOW 20 MIN: CPT | Performed by: NURSE PRACTITIONER

## 2024-11-21 RX ORDER — OMEPRAZOLE 40 MG/1
80 CAPSULE, DELAYED RELEASE ORAL
Qty: 180 | Refills: 4 | Status: ACTIVE
Start: 2024-11-21

## 2025-01-31 ENCOUNTER — ON CAMPUS - OUTPATIENT (AMBULATORY)
Dept: URBAN - METROPOLITAN AREA HOSPITAL 2 | Facility: HOSPITAL | Age: 50
End: 2025-01-31
Payer: COMMERCIAL

## 2025-01-31 ENCOUNTER — OFFICE (AMBULATORY)
Dept: URBAN - METROPOLITAN AREA PATHOLOGY 19 | Facility: PATHOLOGY | Age: 50
End: 2025-01-31
Payer: COMMERCIAL

## 2025-01-31 VITALS
HEART RATE: 76 BPM | DIASTOLIC BLOOD PRESSURE: 49 MMHG | OXYGEN SATURATION: 100 % | SYSTOLIC BLOOD PRESSURE: 110 MMHG | SYSTOLIC BLOOD PRESSURE: 91 MMHG | SYSTOLIC BLOOD PRESSURE: 104 MMHG | WEIGHT: 134 LBS | HEART RATE: 56 BPM | DIASTOLIC BLOOD PRESSURE: 55 MMHG | SYSTOLIC BLOOD PRESSURE: 93 MMHG | SYSTOLIC BLOOD PRESSURE: 100 MMHG | DIASTOLIC BLOOD PRESSURE: 58 MMHG | RESPIRATION RATE: 18 BRPM | HEART RATE: 67 BPM | HEART RATE: 78 BPM | DIASTOLIC BLOOD PRESSURE: 54 MMHG | SYSTOLIC BLOOD PRESSURE: 116 MMHG | SYSTOLIC BLOOD PRESSURE: 87 MMHG | HEART RATE: 71 BPM | HEIGHT: 64 IN | SYSTOLIC BLOOD PRESSURE: 98 MMHG | SYSTOLIC BLOOD PRESSURE: 113 MMHG | DIASTOLIC BLOOD PRESSURE: 56 MMHG | DIASTOLIC BLOOD PRESSURE: 9 MMHG | HEART RATE: 73 BPM | HEART RATE: 74 BPM | DIASTOLIC BLOOD PRESSURE: 69 MMHG | DIASTOLIC BLOOD PRESSURE: 72 MMHG | TEMPERATURE: 98.2 F | DIASTOLIC BLOOD PRESSURE: 92 MMHG | RESPIRATION RATE: 14 BRPM | SYSTOLIC BLOOD PRESSURE: 118 MMHG | DIASTOLIC BLOOD PRESSURE: 61 MMHG | OXYGEN SATURATION: 98 % | DIASTOLIC BLOOD PRESSURE: 67 MMHG

## 2025-01-31 DIAGNOSIS — Z86.0101 PERSONAL HISTORY OF ADENOMATOUS AND SERRATED COLON POLYPS: ICD-10-CM

## 2025-01-31 DIAGNOSIS — K29.50 UNSPECIFIED CHRONIC GASTRITIS WITHOUT BLEEDING: ICD-10-CM

## 2025-01-31 DIAGNOSIS — K31.89 OTHER DISEASES OF STOMACH AND DUODENUM: ICD-10-CM

## 2025-01-31 DIAGNOSIS — D12.3 BENIGN NEOPLASM OF TRANSVERSE COLON: ICD-10-CM

## 2025-01-31 DIAGNOSIS — K21.9 GASTRO-ESOPHAGEAL REFLUX DISEASE WITHOUT ESOPHAGITIS: ICD-10-CM

## 2025-01-31 DIAGNOSIS — Z09 ENCOUNTER FOR FOLLOW-UP EXAMINATION AFTER COMPLETED TREATMEN: ICD-10-CM

## 2025-01-31 PROBLEM — K29.70 GASTRITIS, UNSPECIFIED, WITHOUT BLEEDING: Status: ACTIVE | Noted: 2025-01-31

## 2025-01-31 PROCEDURE — 43239 EGD BIOPSY SINGLE/MULTIPLE: CPT | Performed by: INTERNAL MEDICINE

## 2025-01-31 PROCEDURE — 88342 IMHCHEM/IMCYTCHM 1ST ANTB: CPT | Performed by: PATHOLOGY

## 2025-01-31 PROCEDURE — 88305 TISSUE EXAM BY PATHOLOGIST: CPT | Performed by: PATHOLOGY

## 2025-01-31 PROCEDURE — 45385 COLONOSCOPY W/LESION REMOVAL: CPT | Mod: 33 | Performed by: INTERNAL MEDICINE

## 2025-01-31 NOTE — SERVICEHPINOTES
EVER RAYGOZA  is a  49  female   who presents today for a  EGD-Colonoscopy   for   the indications listed below. The updated Patient Profile was reviewed prior to the procedure, in conjunction with the Physical Exam, including medical conditions, surgical procedures, medications, allergies, family history and social history. See Physical Exam time stamp below for date and time of HPI completion.Pre-operatively, I reviewed the indication(s) for the procedure, the risks of the procedure [including but not limited to: unexpected bleeding possibly requiring hospitalization and/or unplanned repeat procedures, perforation possibly requiring surgical treatment, missed lesions and complications of sedation/general anesthesia (also explained by anesthesia staff)]. I have evaluated the patient for risks associated with the planned anesthesia and the procedure to be performed and find the patient an acceptable candidate for IV sedation.Multiple opportunities were provided for any questions or concerns, and all questions were answered satisfactorily before any anesthesia was administered. We will proceed with the planned procedure.br

## (undated) DEVICE — PK ENDO GI 50

## (undated) DEVICE — TRAP WIDEEYE POLYP

## (undated) DEVICE — SNAR POLYP HOTSNARE/BRAIDED OVL/MINI 7F 2.8X10MM 230CM 1P/U

## (undated) DEVICE — BITEBLOCK ENDO W/STRAP 60F A/ LF DISP

## (undated) DEVICE — KT SYR GEL ORISE SNGL PK 10ML